# Patient Record
Sex: FEMALE | Race: WHITE | NOT HISPANIC OR LATINO | Employment: OTHER | URBAN - METROPOLITAN AREA
[De-identification: names, ages, dates, MRNs, and addresses within clinical notes are randomized per-mention and may not be internally consistent; named-entity substitution may affect disease eponyms.]

---

## 2023-03-08 ENCOUNTER — TELEPHONE (OUTPATIENT)
Dept: PRIMARY CARE | Facility: CLINIC | Age: 68
End: 2023-03-08

## 2023-03-08 DIAGNOSIS — U07.1 COVID-19: Primary | ICD-10-CM

## 2023-03-08 NOTE — TELEPHONE ENCOUNTER
Pt tested positive for covid yesterday and is requesting paxlovid. Her symptoms started yesterday. She uses the [facility] in [facility] on [address].

## 2023-07-20 LAB
ALANINE AMINOTRANSFERASE (SGPT) (U/L) IN SER/PLAS: 15 U/L (ref 7–45)
ALBUMIN (G/DL) IN SER/PLAS: 4.3 G/DL (ref 3.4–5)
ALKALINE PHOSPHATASE (U/L) IN SER/PLAS: 54 U/L (ref 33–136)
ANION GAP IN SER/PLAS: 12 MMOL/L (ref 10–20)
ASPARTATE AMINOTRANSFERASE (SGOT) (U/L) IN SER/PLAS: 25 U/L (ref 9–39)
BILIRUBIN TOTAL (MG/DL) IN SER/PLAS: 0.8 MG/DL (ref 0–1.2)
CALCIUM (MG/DL) IN SER/PLAS: 9.8 MG/DL (ref 8.6–10.3)
CARBON DIOXIDE, TOTAL (MMOL/L) IN SER/PLAS: 29 MMOL/L (ref 21–32)
CHLORIDE (MMOL/L) IN SER/PLAS: 103 MMOL/L (ref 98–107)
CREATININE (MG/DL) IN SER/PLAS: 0.79 MG/DL (ref 0.5–1.05)
ERYTHROCYTE DISTRIBUTION WIDTH (RATIO) BY AUTOMATED COUNT: 13.1 % (ref 11.5–14.5)
ERYTHROCYTE MEAN CORPUSCULAR HEMOGLOBIN CONCENTRATION (G/DL) BY AUTOMATED: 31.2 G/DL (ref 32–36)
ERYTHROCYTE MEAN CORPUSCULAR VOLUME (FL) BY AUTOMATED COUNT: 98 FL (ref 80–100)
ERYTHROCYTES (10*6/UL) IN BLOOD BY AUTOMATED COUNT: 4.53 X10E12/L (ref 4–5.2)
GFR FEMALE: 80 ML/MIN/1.73M2
GLUCOSE (MG/DL) IN SER/PLAS: 119 MG/DL (ref 74–99)
HEMATOCRIT (%) IN BLOOD BY AUTOMATED COUNT: 44.2 % (ref 36–46)
HEMOGLOBIN (G/DL) IN BLOOD: 13.8 G/DL (ref 12–16)
LEUKOCYTES (10*3/UL) IN BLOOD BY AUTOMATED COUNT: 8.4 X10E9/L (ref 4.4–11.3)
PLATELETS (10*3/UL) IN BLOOD AUTOMATED COUNT: 292 X10E9/L (ref 150–450)
POTASSIUM (MMOL/L) IN SER/PLAS: 4.5 MMOL/L (ref 3.5–5.3)
PROTEIN TOTAL: 7 G/DL (ref 6.4–8.2)
SODIUM (MMOL/L) IN SER/PLAS: 139 MMOL/L (ref 136–145)
UREA NITROGEN (MG/DL) IN SER/PLAS: 19 MG/DL (ref 6–23)

## 2023-07-23 LAB
ALBUMIN ELP: 4.2 G/DL (ref 3.4–5)
ALPHA 1: 0.2 G/DL (ref 0.2–0.6)
ALPHA 2: 0.6 G/DL (ref 0.4–1.1)
BETA: 0.8 G/DL (ref 0.5–1.2)
GAMMA GLOBULIN: 1.1 G/DL (ref 0.5–1.4)
PATH REVIEW-SERUM PROTEIN ELECTROPHORESIS: NORMAL
PROTEIN ELECTROPHORESIS INTERPRETATION: NORMAL
PROTEIN TOTAL: 7 G/DL (ref 6.4–8.2)

## 2023-07-25 LAB — N-TELOPEPTIDE,SERUM: 19.4 NM BCE

## 2023-07-26 LAB
ALKALINE PHOSPHATASE (REF): 63 U/L (ref 40–120)
ALKALINE PHOSPHATASE OTHER: 0 U/L
ALKALINE PHOSPHATASE.BONE (U/L) IN SERUM OR PLASMA: 27 U/L (ref 0–55)
ALKALINE PHOSPHATASE.LIVER (U/L) IN SERUM OR PLASMA: 36 U/L (ref 0–94)

## 2023-08-16 ENCOUNTER — APPOINTMENT (OUTPATIENT)
Dept: PRIMARY CARE | Facility: CLINIC | Age: 68
End: 2023-08-16
Payer: MEDICARE

## 2023-09-20 ENCOUNTER — OFFICE VISIT (OUTPATIENT)
Dept: PRIMARY CARE | Facility: CLINIC | Age: 68
End: 2023-09-20
Payer: MEDICARE

## 2023-09-20 VITALS
HEART RATE: 74 BPM | TEMPERATURE: 97.8 F | OXYGEN SATURATION: 97 % | WEIGHT: 154 LBS | BODY MASS INDEX: 24.17 KG/M2 | HEIGHT: 67 IN | DIASTOLIC BLOOD PRESSURE: 70 MMHG | SYSTOLIC BLOOD PRESSURE: 130 MMHG

## 2023-09-20 DIAGNOSIS — Z00.00 MEDICARE ANNUAL WELLNESS VISIT, SUBSEQUENT: Primary | ICD-10-CM

## 2023-09-20 DIAGNOSIS — Z12.31 BREAST CANCER SCREENING BY MAMMOGRAM: ICD-10-CM

## 2023-09-20 DIAGNOSIS — H61.22 IMPACTED CERUMEN OF LEFT EAR: ICD-10-CM

## 2023-09-20 DIAGNOSIS — K51.20 ULCERATIVE PROCTITIS WITHOUT COMPLICATION (MULTI): ICD-10-CM

## 2023-09-20 DIAGNOSIS — E78.00 ELEVATED LDL CHOLESTEROL LEVEL: ICD-10-CM

## 2023-09-20 PROBLEM — M85.80 OSTEOPENIA: Status: ACTIVE | Noted: 2023-09-20

## 2023-09-20 PROBLEM — J45.909 HYPERACTIVE AIRWAY DISEASE (HHS-HCC): Status: ACTIVE | Noted: 2023-09-20

## 2023-09-20 PROBLEM — M81.0 OSTEOPOROSIS: Status: ACTIVE | Noted: 2023-09-20

## 2023-09-20 PROBLEM — J32.4 CHRONIC PANSINUSITIS: Status: ACTIVE | Noted: 2023-09-20

## 2023-09-20 PROCEDURE — 1160F RVW MEDS BY RX/DR IN RCRD: CPT | Performed by: STUDENT IN AN ORGANIZED HEALTH CARE EDUCATION/TRAINING PROGRAM

## 2023-09-20 PROCEDURE — 1159F MED LIST DOCD IN RCRD: CPT | Performed by: STUDENT IN AN ORGANIZED HEALTH CARE EDUCATION/TRAINING PROGRAM

## 2023-09-20 PROCEDURE — 1036F TOBACCO NON-USER: CPT | Performed by: STUDENT IN AN ORGANIZED HEALTH CARE EDUCATION/TRAINING PROGRAM

## 2023-09-20 PROCEDURE — G0439 PPPS, SUBSEQ VISIT: HCPCS | Performed by: STUDENT IN AN ORGANIZED HEALTH CARE EDUCATION/TRAINING PROGRAM

## 2023-09-20 PROCEDURE — 69210 REMOVE IMPACTED EAR WAX UNI: CPT | Performed by: STUDENT IN AN ORGANIZED HEALTH CARE EDUCATION/TRAINING PROGRAM

## 2023-09-20 PROCEDURE — 1170F FXNL STATUS ASSESSED: CPT | Performed by: STUDENT IN AN ORGANIZED HEALTH CARE EDUCATION/TRAINING PROGRAM

## 2023-09-20 RX ORDER — FLUTICASONE PROPIONATE 50 MCG
1 SPRAY, SUSPENSION (ML) NASAL DAILY
COMMUNITY

## 2023-09-20 RX ORDER — CALCIUM CARBONATE 500(1250)
2 TABLET ORAL DAILY
COMMUNITY

## 2023-09-20 RX ORDER — ALBUTEROL SULFATE 90 UG/1
2 AEROSOL, METERED RESPIRATORY (INHALATION) EVERY 4 HOURS PRN
COMMUNITY
Start: 2021-06-02

## 2023-09-20 RX ORDER — FLUTICASONE FUROATE 100 UG/1
1 POWDER RESPIRATORY (INHALATION) DAILY
Status: ON HOLD | COMMUNITY
End: 2023-12-19 | Stop reason: ENTERED-IN-ERROR

## 2023-09-20 ASSESSMENT — PATIENT HEALTH QUESTIONNAIRE - PHQ9
2. FEELING DOWN, DEPRESSED OR HOPELESS: NOT AT ALL
SUM OF ALL RESPONSES TO PHQ9 QUESTIONS 1 AND 2: 0
1. LITTLE INTEREST OR PLEASURE IN DOING THINGS: NOT AT ALL

## 2023-09-20 ASSESSMENT — ACTIVITIES OF DAILY LIVING (ADL)
GROCERY_SHOPPING: INDEPENDENT
BATHING: INDEPENDENT
TAKING_MEDICATION: INDEPENDENT
DRESSING: INDEPENDENT
DOING_HOUSEWORK: INDEPENDENT
MANAGING_FINANCES: INDEPENDENT

## 2023-09-20 ASSESSMENT — ENCOUNTER SYMPTOMS
DIZZINESS: 0
DIARRHEA: 0
NAUSEA: 0
SHORTNESS OF BREATH: 0
WHEEZING: 0
PALPITATIONS: 0
OCCASIONAL FEELINGS OF UNSTEADINESS: 0
DYSURIA: 0
ABDOMINAL PAIN: 0
FREQUENCY: 0
HEMATURIA: 0
DYSPHORIC MOOD: 0
HEADACHES: 0
FATIGUE: 0
NUMBNESS: 0
CONSTIPATION: 0
COUGH: 0
NERVOUS/ANXIOUS: 0
DEPRESSION: 0
CHILLS: 0
LOSS OF SENSATION IN FEET: 0
FEVER: 0

## 2023-09-20 NOTE — PATIENT INSTRUCTIONS
Recommendations for women annual wellness exam:   Make sure screenings for cervical, breast, and colon cancer are up to date if applicable- pap smears age 21-65, discuss mammogram starting at age 40, colonoscopy at age 45, earlier if positive family history for breast or colon cancer   Screen for osteoporosis with DXA bone scan starting at age 65 or sooner if risk factors present (long term steroid use, smoking, heavy alcohol use, history of fracture, rheumatoid arthritis, low body weight, family history of hip fracture)  Screening for lung cancer with low-dose CT in all adults age 55-77 who have a 30 pack-year smoking history and currently smoke or have quit within the past 15 years  Follow a healthy diet (Dash diet, Mediterranean diet)  Exercise 150 min/wk   Maintain healthy weight (BMI < 25)   Do not smoke   Alcohol in moderation (up to 1 drink/day)  Get enough sleep (7-8 hours/night)  Make sure immunizations are up to date (influenza, pneumococcal, Tdap, shingles if age > 50)  Postmenopausal women or women with osteoporosis need minimum 1,200 mg calcium and 800 IU vitamin D daily  Talk to your physician if you have concerns about depression or anxiety  Visit dentist twice yearly

## 2023-09-20 NOTE — PROGRESS NOTES
Subjective   Reason for Visit: Linda Gomez is an 68 y.o. female here for a Medicare Wellness visit.     HPI  Specialists seen by patient: -Pulm: Dr Javier, pulmonology  -rheum: Dr Boudreaux, osteopenia  -ENT: Dr Thapa, chronic sinus issues  -GI: Dr Chaney, ulcerative proctitis in remission, not on any medication  -Ortho: [facility]  -Derm  -dentist  -eye doctor    Last mammogram: approximate date 12/22 and was normal and will order  Hx of colon ca screening: yes, 2022 repeat in 2025  Hx of DXA: yes, 2022, left total femur -1.6, left femoral neck -2.0, spine 0.1  History of depression or anxiety:no  Immunizations:  UTD, will get flu and covid together  Diet: Follows a healthy diet  Exercise: Gets regular exercise, walking, hiking, occasional weights, walks her dog   Alcohol abuse screen:   4 glasses of wine per week   How many times in the past year 4 or more drinks in a day?  Lung cancer screening:   Smoking history: never a smoker  Drug use: No    Patient Self Assessment of Health Status  Patient Self Assessment: Excellent  Functional Ability/Level of Safety  Cognitive Impairment Observed: No cognitive impairment observed  [place]alls Home Safety Risk Factors  Home Safety Risk Factors: Loose rugs, No grab bars, bathroom  Nutrition and Exercise  Current Diet: Well Balanced Diet  Adequate Fluid Intake: Yes  Caffeine: Yes  Exercise Frequency: Regularly  ADL Screening  Hearing - Right Ear: Difficulty with noise  Hearing - Left Ear: Difficulty with noise  Bathing: Independent  Dressing: Independent  Walks in Home: Independent  IADL's  Managing Finances: Independent  Grocery Shopping: Independent  Taking Medication: Independent  Doing Housework: Independent      Patient Care Team:  Linda Keating DO as PCP - General (Family Medicine)     Review of Systems   Constitutional:  Negative for chills, fatigue and fever.   Respiratory:  Negative for cough, shortness of breath and wheezing.    Cardiovascular:  Negative for chest pain,  "palpitations and leg swelling.   Gastrointestinal:  Negative for abdominal pain, constipation, diarrhea and nausea.   Genitourinary:  Negative for dysuria, frequency, hematuria and urgency.   Neurological:  Negative for dizziness, numbness and headaches.   Psychiatric/Behavioral:  Negative for dysphoric mood. The patient is not nervous/anxious.        Objective   Vitals:  /70 (BP Location: Left arm, Patient Position: Sitting, BP Cuff Size: Adult)   Pulse 74   Temp 36.6 °C (97.8 °F) (Temporal)   Ht 1.689 m (5' 6.5\")   Wt 69.9 kg (154 lb)   SpO2 97%   BMI 24.48 kg/m²       Physical Exam  Constitutional:       General: She is not in acute distress.     Appearance: Normal appearance.   HENT:      Head: Normocephalic and atraumatic.      Right Ear: Tympanic membrane and ear canal normal.      Left Ear: Tympanic membrane and ear canal normal. There is impacted cerumen.      Mouth/Throat:      Mouth: Mucous membranes are moist.      Pharynx: No posterior oropharyngeal erythema.   Eyes:      Extraocular Movements: Extraocular movements intact.      Pupils: Pupils are equal, round, and reactive to light.   Cardiovascular:      Rate and Rhythm: Normal rate and regular rhythm.      Heart sounds: No murmur heard.  Pulmonary:      Effort: Pulmonary effort is normal. No respiratory distress.      Breath sounds: Normal breath sounds. No wheezing.   Abdominal:      General: Bowel sounds are normal.      Palpations: Abdomen is soft.      Tenderness: There is no abdominal tenderness. There is no guarding.   Musculoskeletal:         General: Normal range of motion.      Cervical back: Neck supple.   Skin:     General: Skin is warm and dry.   Neurological:      General: No focal deficit present.      Mental Status: She is alert and oriented to person, place, and time.   Psychiatric:         Mood and Affect: Mood normal.         Behavior: Behavior normal.         Assessment/Plan   Problem List Items Addressed This Visit  "      Ulcerative proctitis without complication (CMS/HCC)    Current Assessment & Plan     Follows with GI.  Colonoscopy 2022, repeat in 2027.  In remission, on no medications.         Elevated LDL cholesterol level    Relevant Orders    Lipid Panel     Other Visit Diagnoses       Medicare annual wellness visit, subsequent    -  Primary    Impacted cerumen of left ear        Breast cancer screening by mammogram        Relevant Orders    BI mammo bilateral screening tomosynthesis          We will obtain fasting blood work.  Results will be communicated to the patient via MyChart or a letter.   We reviewed appropriate preventative health screening guidelines. The patient is  up-to-date on vaccinations, recommended covid vaccine and influenza vaccine.  The patient had colonoscopy in 2022.  They were advised to repeat screening in 2027.  We discussed regular aerobic exercise. We discussed proper nutrition and weight control.  Mammogram ordered.

## 2023-09-20 NOTE — PROGRESS NOTES
Patient ID: Linda Gomez is a 68 y.o. female.    Ear Cerumen Removal    Date/Time: 9/20/2023 4:25 PM    Performed by: Linda Keating DO  Authorized by: Linda Keating DO    Consent:     Consent obtained:  Verbal    Consent given by:  Patient    Risks discussed:  Pain, incomplete removal, bleeding, dizziness and TM perforation    Alternatives discussed:  Referral and no treatment  Universal protocol:     Patient identity confirmed:  Verbally with patient  Procedure details:     Location:  L ear    Procedure type: curette      Procedure outcomes: cerumen removed    Post-procedure details:     Inspection:  TM intact    Hearing quality:  Improved    Procedure completion:  Tolerated well, no immediate complications

## 2023-12-06 ENCOUNTER — APPOINTMENT (OUTPATIENT)
Dept: PULMONOLOGY | Facility: CLINIC | Age: 68
End: 2023-12-06
Payer: MEDICARE

## 2023-12-07 ENCOUNTER — OFFICE VISIT (OUTPATIENT)
Dept: PULMONOLOGY | Facility: HOSPITAL | Age: 68
End: 2023-12-07
Payer: MEDICARE

## 2023-12-07 ENCOUNTER — APPOINTMENT (OUTPATIENT)
Dept: PULMONOLOGY | Facility: HOSPITAL | Age: 68
End: 2023-12-07
Payer: MEDICARE

## 2023-12-07 VITALS
WEIGHT: 143 LBS | TEMPERATURE: 97.5 F | DIASTOLIC BLOOD PRESSURE: 75 MMHG | OXYGEN SATURATION: 98 % | HEIGHT: 67 IN | SYSTOLIC BLOOD PRESSURE: 122 MMHG | HEART RATE: 76 BPM | BODY MASS INDEX: 22.44 KG/M2

## 2023-12-07 DIAGNOSIS — J45.991 COUGH VARIANT ASTHMA (HHS-HCC): Primary | ICD-10-CM

## 2023-12-07 PROCEDURE — 1036F TOBACCO NON-USER: CPT | Performed by: INTERNAL MEDICINE

## 2023-12-07 PROCEDURE — 1159F MED LIST DOCD IN RCRD: CPT | Performed by: INTERNAL MEDICINE

## 2023-12-07 PROCEDURE — 99213 OFFICE O/P EST LOW 20 MIN: CPT | Mod: GC | Performed by: INTERNAL MEDICINE

## 2023-12-07 PROCEDURE — 1160F RVW MEDS BY RX/DR IN RCRD: CPT | Performed by: INTERNAL MEDICINE

## 2023-12-07 PROCEDURE — 99213 OFFICE O/P EST LOW 20 MIN: CPT | Performed by: INTERNAL MEDICINE

## 2023-12-07 PROCEDURE — 1126F AMNT PAIN NOTED NONE PRSNT: CPT | Performed by: INTERNAL MEDICINE

## 2023-12-07 RX ORDER — FLUTICASONE FUROATE 100 UG/1
1 POWDER RESPIRATORY (INHALATION) DAILY
Qty: 3 EACH | Refills: 3 | Status: SHIPPED | OUTPATIENT
Start: 2023-12-07

## 2023-12-07 SDOH — ECONOMIC STABILITY: FOOD INSECURITY: WITHIN THE PAST 12 MONTHS, YOU WORRIED THAT YOUR FOOD WOULD RUN OUT BEFORE YOU GOT MONEY TO BUY MORE.: NEVER TRUE

## 2023-12-07 SDOH — ECONOMIC STABILITY: FOOD INSECURITY: WITHIN THE PAST 12 MONTHS, THE FOOD YOU BOUGHT JUST DIDN'T LAST AND YOU DIDN'T HAVE MONEY TO GET MORE.: NEVER TRUE

## 2023-12-07 ASSESSMENT — COLUMBIA-SUICIDE SEVERITY RATING SCALE - C-SSRS
1. IN THE PAST MONTH, HAVE YOU WISHED YOU WERE DEAD OR WISHED YOU COULD GO TO SLEEP AND NOT WAKE UP?: NO
6. HAVE YOU EVER DONE ANYTHING, STARTED TO DO ANYTHING, OR PREPARED TO DO ANYTHING TO END YOUR LIFE?: NO
2. HAVE YOU ACTUALLY HAD ANY THOUGHTS OF KILLING YOURSELF?: NO
6. HAVE YOU EVER DONE ANYTHING, STARTED TO DO ANYTHING, OR PREPARED TO DO ANYTHING TO END YOUR LIFE?: NO
1. IN THE PAST MONTH, HAVE YOU WISHED YOU WERE DEAD OR WISHED YOU COULD GO TO SLEEP AND NOT WAKE UP?: NO
2. HAVE YOU ACTUALLY HAD ANY THOUGHTS OF KILLING YOURSELF?: NO

## 2023-12-07 ASSESSMENT — PATIENT HEALTH QUESTIONNAIRE - PHQ9
1. LITTLE INTEREST OR PLEASURE IN DOING THINGS: NOT AT ALL
1. LITTLE INTEREST OR PLEASURE IN DOING THINGS: NOT AT ALL
SUM OF ALL RESPONSES TO PHQ9 QUESTIONS 1 AND 2: 0
2. FEELING DOWN, DEPRESSED OR HOPELESS: NOT AT ALL
2. FEELING DOWN, DEPRESSED OR HOPELESS: NOT AT ALL
SUM OF ALL RESPONSES TO PHQ9 QUESTIONS 1 AND 2: 0

## 2023-12-07 ASSESSMENT — PAIN SCALES - GENERAL: PAINLEVEL: 0-NO PAIN

## 2023-12-07 ASSESSMENT — ENCOUNTER SYMPTOMS
OCCASIONAL FEELINGS OF UNSTEADINESS: 0
LOSS OF SENSATION IN FEET: 0
DEPRESSION: 0

## 2023-12-07 NOTE — PATIENT INSTRUCTIONS
Thanks for coming in today. I'm glad your symptoms are doing great.   -I will refill your Arnuity, continue taking daily  -follow up in 6 months, preferably with Dr Javier at that time given your preference

## 2023-12-07 NOTE — PROGRESS NOTES
Reason For Visit  Cough variant Asthma    History Of Present Illness  Ms Gomez is a 67 yo woman with PMHx s/f Asthma, DLD, Osteoporosis who presents to clinic for follow up.     Patient last seen 12/2022 at which time was diagnosed with cough variant asthma. She was recommended to continue ICS, to use antihistamines PRN.     Today she reports doing very well on inhaled ICS. Denies cough, SOB, chest tightness. She has only used albuterol once since the summer when working outside in the yard. Reports good compliance with inhaler, requests refill today. Denies LE edema, orthopnea or PND. Denies recent illness. No longer using Zyrtec and Flonase pending ENT follow up, reports allergy symptoms are well controlled.     12 point ROS is negative except noted above     Past Medical History  Past Medical History:   Diagnosis Date    Personal history of other diseases of the musculoskeletal system and connective tissue     History of osteoarthritis       Surgical History  Past Surgical History:   Procedure Laterality Date    ANKLE SURGERY  01/13/2015    Ankle Surgery    KNEE ARTHROSCOPY W/ DEBRIDEMENT  01/30/2014    Arthroscopy Knee Right    OTHER SURGICAL HISTORY  01/30/2014    Reported Hx Of Hip Replacement - Right Side    OTHER SURGICAL HISTORY  01/30/2014    Treatment Of Knee Fracture Patellar, Open    ROTATOR CUFF REPAIR  01/30/2014    Rotator Cuff Repair       Family History  Family History   Problem Relation Name Age of Onset    Other (esophageal ulcer) Mother      Osteoporosis Mother      Transient ischemic attack Mother      Diabetes Father      Drug abuse Father      Hypertension Father      Cataracts Sister      Diabetes Sister      Hypothyroidism Sister      Lung cancer Sister          Social History  Social History     Tobacco Use    Smoking status: Never    Smokeless tobacco: Never   Vaping Use    Vaping Use: Never used   Substance Use Topics    Alcohol use: Yes     Alcohol/week: 3.0 standard drinks of alcohol  "    Types: 3 Glasses of wine per week    Drug use: Never      Allergies  Amoxicillin-pot clavulanate, Cat dander, Dog hair standardized allergenic extract, Pollen extracts, Ragweed, and Sulfasalazine    Vital Signs  Blood pressure 122/75, pulse 76, temperature 36.4 °C (97.5 °F), height 1.702 m (5' 7\"), weight 64.9 kg (143 lb), SpO2 98 %.  Oxygen Therapy  SpO2: 98 %              Physical Exam  Gen: Pleasant, no acute distress  HEENT: no gross abnormalities  CV: RRR  Lungs: CTAB  GI: soft, non tender, non distended  Ext: no LE edema  Neuro: AOx3, motor and sensory grossly intact  Psych: appears appropriate      Relevant Results  CT SINUS WITHOUT CONTRAST VOLUMETRIC SURGICAL PLANNING    - Impression -  Clinical described polyp within the right middle meatus is not  visualized on exam, which may be due to posttreatment change.    Scattered paranasal sinus mucosal disease as described.    Slight rightward deviation of the bony nasal septum contacting the  right middle turbinate and partially effacing the right middle meatus.    Variant anatomy as discussed.      Assessment/Plan   Ms Gomez is a 69 yo woman with PMHx s/f Asthma, DLD, Osteoporosis who presents to clinic for follow up. Doing well.     #Cough Variant Asthma  -under excellent control  -ICS refilled  -patient requesting follow up with Dr Javier if he returns to practice  -RTC in 6 months, preferred with Dr Javier, any [facility] provider okay if not available    Kamala Keating MD       "

## 2023-12-08 ENCOUNTER — APPOINTMENT (OUTPATIENT)
Dept: PULMONOLOGY | Facility: HOSPITAL | Age: 68
End: 2023-12-08
Payer: MEDICARE

## 2023-12-18 ENCOUNTER — APPOINTMENT (OUTPATIENT)
Dept: CARDIOLOGY | Facility: HOSPITAL | Age: 68
End: 2023-12-18
Payer: MEDICARE

## 2023-12-18 ENCOUNTER — APPOINTMENT (OUTPATIENT)
Dept: RADIOLOGY | Facility: HOSPITAL | Age: 68
End: 2023-12-18
Payer: MEDICARE

## 2023-12-18 ENCOUNTER — HOSPITAL ENCOUNTER (OUTPATIENT)
Facility: HOSPITAL | Age: 68
Setting detail: OBSERVATION
Discharge: HOME | End: 2023-12-19
Attending: EMERGENCY MEDICINE | Admitting: INTERNAL MEDICINE
Payer: MEDICARE

## 2023-12-18 DIAGNOSIS — R00.2 PALPITATIONS: ICD-10-CM

## 2023-12-18 DIAGNOSIS — R07.9 CHEST PAIN, UNSPECIFIED TYPE: ICD-10-CM

## 2023-12-18 DIAGNOSIS — I49.3 FREQUENT PVCS: ICD-10-CM

## 2023-12-18 DIAGNOSIS — I49.8 BIGEMINY: Primary | ICD-10-CM

## 2023-12-18 LAB
ALBUMIN SERPL BCP-MCNC: 5.2 G/DL (ref 3.4–5)
ALP SERPL-CCNC: 67 U/L (ref 33–136)
ALT SERPL W P-5'-P-CCNC: 14 U/L (ref 7–45)
ANION GAP SERPL CALC-SCNC: 15 MMOL/L (ref 10–20)
APPEARANCE UR: ABNORMAL
AST SERPL W P-5'-P-CCNC: 27 U/L (ref 9–39)
BACTERIA #/AREA URNS AUTO: ABNORMAL /HPF
BASOPHILS # BLD AUTO: 0.08 X10*3/UL (ref 0–0.1)
BASOPHILS NFR BLD AUTO: 0.9 %
BILIRUB SERPL-MCNC: 0.6 MG/DL (ref 0–1.2)
BILIRUB UR STRIP.AUTO-MCNC: NEGATIVE MG/DL
BUN SERPL-MCNC: 16 MG/DL (ref 6–23)
CALCIUM SERPL-MCNC: 10.7 MG/DL (ref 8.6–10.3)
CARDIAC TROPONIN I PNL SERPL HS: 10 NG/L (ref 0–13)
CARDIAC TROPONIN I PNL SERPL HS: 6 NG/L (ref 0–13)
CHLORIDE SERPL-SCNC: 99 MMOL/L (ref 98–107)
CO2 SERPL-SCNC: 26 MMOL/L (ref 21–32)
COLOR UR: ABNORMAL
CREAT SERPL-MCNC: 1.1 MG/DL (ref 0.5–1.05)
EOSINOPHIL # BLD AUTO: 0.17 X10*3/UL (ref 0–0.7)
EOSINOPHIL NFR BLD AUTO: 2 %
ERYTHROCYTE [DISTWIDTH] IN BLOOD BY AUTOMATED COUNT: 13.2 % (ref 11.5–14.5)
GFR SERPL CREATININE-BSD FRML MDRD: 54 ML/MIN/1.73M*2
GLUCOSE SERPL-MCNC: 85 MG/DL (ref 74–99)
GLUCOSE UR STRIP.AUTO-MCNC: NEGATIVE MG/DL
HCT VFR BLD AUTO: 48.2 % (ref 36–46)
HGB BLD-MCNC: 15.6 G/DL (ref 12–16)
IMM GRANULOCYTES # BLD AUTO: 0.02 X10*3/UL (ref 0–0.7)
IMM GRANULOCYTES NFR BLD AUTO: 0.2 % (ref 0–0.9)
KETONES UR STRIP.AUTO-MCNC: ABNORMAL MG/DL
LEUKOCYTE ESTERASE UR QL STRIP.AUTO: ABNORMAL
LYMPHOCYTES # BLD AUTO: 3.12 X10*3/UL (ref 1.2–4.8)
LYMPHOCYTES NFR BLD AUTO: 36.6 %
MAGNESIUM SERPL-MCNC: 1.9 MG/DL (ref 1.6–2.4)
MCH RBC QN AUTO: 31.4 PG (ref 26–34)
MCHC RBC AUTO-ENTMCNC: 32.4 G/DL (ref 32–36)
MCV RBC AUTO: 97 FL (ref 80–100)
MONOCYTES # BLD AUTO: 0.62 X10*3/UL (ref 0.1–1)
MONOCYTES NFR BLD AUTO: 7.3 %
MUCOUS THREADS #/AREA URNS AUTO: ABNORMAL /LPF
NEUTROPHILS # BLD AUTO: 4.52 X10*3/UL (ref 1.2–7.7)
NEUTROPHILS NFR BLD AUTO: 53 %
NITRITE UR QL STRIP.AUTO: NEGATIVE
NRBC BLD-RTO: 0 /100 WBCS (ref 0–0)
PH UR STRIP.AUTO: 5 [PH]
PLATELET # BLD AUTO: 331 X10*3/UL (ref 150–450)
POTASSIUM SERPL-SCNC: 3.6 MMOL/L (ref 3.5–5.3)
PROT SERPL-MCNC: 9.1 G/DL (ref 6.4–8.2)
PROT UR STRIP.AUTO-MCNC: NEGATIVE MG/DL
RBC # BLD AUTO: 4.97 X10*6/UL (ref 4–5.2)
RBC # UR STRIP.AUTO: ABNORMAL /UL
RBC #/AREA URNS AUTO: ABNORMAL /HPF
SODIUM SERPL-SCNC: 136 MMOL/L (ref 136–145)
SP GR UR STRIP.AUTO: 1
SQUAMOUS #/AREA URNS AUTO: ABNORMAL /HPF
T4 FREE SERPL-MCNC: 1 NG/DL (ref 0.61–1.12)
TSH SERPL-ACNC: 7.26 MIU/L (ref 0.44–3.98)
UROBILINOGEN UR STRIP.AUTO-MCNC: <2 MG/DL
WBC # BLD AUTO: 8.5 X10*3/UL (ref 4.4–11.3)
WBC #/AREA URNS AUTO: ABNORMAL /HPF

## 2023-12-18 PROCEDURE — 85025 COMPLETE CBC W/AUTO DIFF WBC: CPT | Performed by: EMERGENCY MEDICINE

## 2023-12-18 PROCEDURE — 83735 ASSAY OF MAGNESIUM: CPT | Performed by: EMERGENCY MEDICINE

## 2023-12-18 PROCEDURE — 71275 CT ANGIOGRAPHY CHEST: CPT

## 2023-12-18 PROCEDURE — 74174 CTA ABD&PLVS W/CONTRAST: CPT | Performed by: RADIOLOGY

## 2023-12-18 PROCEDURE — 99285 EMERGENCY DEPT VISIT HI MDM: CPT | Mod: 25 | Performed by: EMERGENCY MEDICINE

## 2023-12-18 PROCEDURE — 71275 CT ANGIOGRAPHY CHEST: CPT | Performed by: RADIOLOGY

## 2023-12-18 PROCEDURE — 81001 URINALYSIS AUTO W/SCOPE: CPT | Performed by: EMERGENCY MEDICINE

## 2023-12-18 PROCEDURE — 93005 ELECTROCARDIOGRAM TRACING: CPT

## 2023-12-18 PROCEDURE — 36415 COLL VENOUS BLD VENIPUNCTURE: CPT | Performed by: EMERGENCY MEDICINE

## 2023-12-18 PROCEDURE — 84443 ASSAY THYROID STIM HORMONE: CPT | Performed by: EMERGENCY MEDICINE

## 2023-12-18 PROCEDURE — 87086 URINE CULTURE/COLONY COUNT: CPT | Mod: AHULAB | Performed by: EMERGENCY MEDICINE

## 2023-12-18 PROCEDURE — 96372 THER/PROPH/DIAG INJ SC/IM: CPT | Mod: 59

## 2023-12-18 PROCEDURE — 84439 ASSAY OF FREE THYROXINE: CPT | Performed by: EMERGENCY MEDICINE

## 2023-12-18 PROCEDURE — 2550000001 HC RX 255 CONTRASTS: Performed by: EMERGENCY MEDICINE

## 2023-12-18 PROCEDURE — 84484 ASSAY OF TROPONIN QUANT: CPT | Performed by: EMERGENCY MEDICINE

## 2023-12-18 PROCEDURE — 2500000004 HC RX 250 GENERAL PHARMACY W/ HCPCS (ALT 636 FOR OP/ED): Performed by: EMERGENCY MEDICINE

## 2023-12-18 PROCEDURE — 80053 COMPREHEN METABOLIC PANEL: CPT | Performed by: EMERGENCY MEDICINE

## 2023-12-18 RX ADMIN — IOHEXOL 75 ML: 350 INJECTION, SOLUTION INTRAVENOUS at 21:59

## 2023-12-18 RX ADMIN — SODIUM CHLORIDE 500 ML: 9 INJECTION, SOLUTION INTRAVENOUS at 23:25

## 2023-12-18 ASSESSMENT — PAIN SCALES - GENERAL: PAINLEVEL_OUTOF10: 6

## 2023-12-18 ASSESSMENT — PAIN DESCRIPTION - ORIENTATION: ORIENTATION: UPPER

## 2023-12-18 ASSESSMENT — COLUMBIA-SUICIDE SEVERITY RATING SCALE - C-SSRS
2. HAVE YOU ACTUALLY HAD ANY THOUGHTS OF KILLING YOURSELF?: NO
1. IN THE PAST MONTH, HAVE YOU WISHED YOU WERE DEAD OR WISHED YOU COULD GO TO SLEEP AND NOT WAKE UP?: NO
6. HAVE YOU EVER DONE ANYTHING, STARTED TO DO ANYTHING, OR PREPARED TO DO ANYTHING TO END YOUR LIFE?: NO

## 2023-12-18 ASSESSMENT — PAIN - FUNCTIONAL ASSESSMENT: PAIN_FUNCTIONAL_ASSESSMENT: 0-10

## 2023-12-18 ASSESSMENT — PAIN DESCRIPTION - LOCATION: LOCATION: BACK

## 2023-12-18 NOTE — Clinical Note
ED UM Review Complete - Patient Meets Inpatient Criteria  @[email or web address]

## 2023-12-19 ENCOUNTER — APPOINTMENT (OUTPATIENT)
Dept: CARDIOLOGY | Facility: HOSPITAL | Age: 68
End: 2023-12-19
Payer: MEDICARE

## 2023-12-19 VITALS
WEIGHT: 148 LBS | HEART RATE: 69 BPM | TEMPERATURE: 97.3 F | BODY MASS INDEX: 23.78 KG/M2 | RESPIRATION RATE: 18 BRPM | HEIGHT: 66 IN | DIASTOLIC BLOOD PRESSURE: 75 MMHG | OXYGEN SATURATION: 95 % | SYSTOLIC BLOOD PRESSURE: 121 MMHG

## 2023-12-19 PROBLEM — R00.2 PALPITATIONS: Status: ACTIVE | Noted: 2023-12-19

## 2023-12-19 LAB
ANION GAP SERPL CALC-SCNC: 11 MMOL/L (ref 10–20)
ATRIAL RATE: 77 BPM
ATRIAL RATE: 79 BPM
BUN SERPL-MCNC: 12 MG/DL (ref 6–23)
CALCIUM SERPL-MCNC: 9.2 MG/DL (ref 8.6–10.3)
CHLORIDE SERPL-SCNC: 107 MMOL/L (ref 98–107)
CO2 SERPL-SCNC: 28 MMOL/L (ref 21–32)
CREAT SERPL-MCNC: 0.82 MG/DL (ref 0.5–1.05)
ERYTHROCYTE [DISTWIDTH] IN BLOOD BY AUTOMATED COUNT: 13.3 % (ref 11.5–14.5)
GFR SERPL CREATININE-BSD FRML MDRD: 77 ML/MIN/1.73M*2
GLUCOSE SERPL-MCNC: 92 MG/DL (ref 74–99)
HCT VFR BLD AUTO: 40.3 % (ref 36–46)
HGB BLD-MCNC: 12.9 G/DL (ref 12–16)
MAGNESIUM SERPL-MCNC: 2.2 MG/DL (ref 1.6–2.4)
MCH RBC QN AUTO: 31.6 PG (ref 26–34)
MCHC RBC AUTO-ENTMCNC: 32 G/DL (ref 32–36)
MCV RBC AUTO: 99 FL (ref 80–100)
NRBC BLD-RTO: 0 /100 WBCS (ref 0–0)
P AXIS: 79 DEGREES
P AXIS: 82 DEGREES
P OFFSET: 201 MS
P OFFSET: 206 MS
P ONSET: 152 MS
P ONSET: 159 MS
PLATELET # BLD AUTO: 270 X10*3/UL (ref 150–450)
POTASSIUM SERPL-SCNC: 4.4 MMOL/L (ref 3.5–5.3)
PR INTERVAL: 132 MS
PR INTERVAL: 140 MS
Q ONSET: 222 MS
Q ONSET: 225 MS
QRS COUNT: 13 BEATS
QRS COUNT: 13 BEATS
QRS DURATION: 74 MS
QRS DURATION: 84 MS
QT INTERVAL: 390 MS
QT INTERVAL: 394 MS
QTC CALCULATION(BAZETT): 441 MS
QTC CALCULATION(BAZETT): 451 MS
QTC FREDERICIA: 423 MS
QTC FREDERICIA: 432 MS
R AXIS: 62 DEGREES
R AXIS: 63 DEGREES
RBC # BLD AUTO: 4.08 X10*6/UL (ref 4–5.2)
SODIUM SERPL-SCNC: 142 MMOL/L (ref 136–145)
T AXIS: 14 DEGREES
T AXIS: 14 DEGREES
T OFFSET: 419 MS
T OFFSET: 420 MS
VENTRICULAR RATE: 77 BPM
VENTRICULAR RATE: 79 BPM
WBC # BLD AUTO: 7.3 X10*3/UL (ref 4.4–11.3)

## 2023-12-19 PROCEDURE — G0378 HOSPITAL OBSERVATION PER HR: HCPCS

## 2023-12-19 PROCEDURE — 80048 BASIC METABOLIC PNL TOTAL CA: CPT | Performed by: PHYSICIAN ASSISTANT

## 2023-12-19 PROCEDURE — 99223 1ST HOSP IP/OBS HIGH 75: CPT | Performed by: NURSE PRACTITIONER

## 2023-12-19 PROCEDURE — 96372 THER/PROPH/DIAG INJ SC/IM: CPT | Mod: 59 | Performed by: PHYSICIAN ASSISTANT

## 2023-12-19 PROCEDURE — 83735 ASSAY OF MAGNESIUM: CPT | Performed by: PHYSICIAN ASSISTANT

## 2023-12-19 PROCEDURE — 96372 THER/PROPH/DIAG INJ SC/IM: CPT

## 2023-12-19 PROCEDURE — 2500000004 HC RX 250 GENERAL PHARMACY W/ HCPCS (ALT 636 FOR OP/ED): Mod: MUEWO,MUE | Performed by: PHYSICIAN ASSISTANT

## 2023-12-19 PROCEDURE — 85027 COMPLETE CBC AUTOMATED: CPT | Performed by: PHYSICIAN ASSISTANT

## 2023-12-19 PROCEDURE — 93005 ELECTROCARDIOGRAM TRACING: CPT

## 2023-12-19 PROCEDURE — 99221 1ST HOSP IP/OBS SF/LOW 40: CPT | Performed by: STUDENT IN AN ORGANIZED HEALTH CARE EDUCATION/TRAINING PROGRAM

## 2023-12-19 PROCEDURE — 36415 COLL VENOUS BLD VENIPUNCTURE: CPT | Performed by: PHYSICIAN ASSISTANT

## 2023-12-19 RX ORDER — METOPROLOL TARTRATE 25 MG/1
25 TABLET, FILM COATED ORAL 2 TIMES DAILY PRN
Status: DISCONTINUED | OUTPATIENT
Start: 2023-12-19 | End: 2023-12-19

## 2023-12-19 RX ORDER — PANTOPRAZOLE SODIUM 40 MG/10ML
40 INJECTION, POWDER, LYOPHILIZED, FOR SOLUTION INTRAVENOUS
Status: DISCONTINUED | OUTPATIENT
Start: 2023-12-19 | End: 2023-12-19 | Stop reason: HOSPADM

## 2023-12-19 RX ORDER — METOPROLOL TARTRATE 25 MG/1
25 TABLET, FILM COATED ORAL 2 TIMES DAILY PRN
Status: DISCONTINUED | OUTPATIENT
Start: 2023-12-19 | End: 2023-12-19 | Stop reason: HOSPADM

## 2023-12-19 RX ORDER — DOCUSATE SODIUM 100 MG/1
100 CAPSULE, LIQUID FILLED ORAL 2 TIMES DAILY PRN
Status: DISCONTINUED | OUTPATIENT
Start: 2023-12-19 | End: 2023-12-19 | Stop reason: HOSPADM

## 2023-12-19 RX ORDER — METOPROLOL TARTRATE 25 MG/1
25 TABLET, FILM COATED ORAL 2 TIMES DAILY PRN
Qty: 30 TABLET | Refills: 0 | Status: SHIPPED | OUTPATIENT
Start: 2023-12-19

## 2023-12-19 RX ORDER — ACETAMINOPHEN 325 MG/1
950 TABLET ORAL EVERY 6 HOURS PRN
Status: DISCONTINUED | OUTPATIENT
Start: 2023-12-19 | End: 2023-12-19 | Stop reason: HOSPADM

## 2023-12-19 RX ORDER — PANTOPRAZOLE SODIUM 40 MG/1
40 TABLET, DELAYED RELEASE ORAL
Status: DISCONTINUED | OUTPATIENT
Start: 2023-12-19 | End: 2023-12-19 | Stop reason: HOSPADM

## 2023-12-19 RX ORDER — TALC
3 POWDER (GRAM) TOPICAL
Status: DISCONTINUED | OUTPATIENT
Start: 2023-12-19 | End: 2023-12-19 | Stop reason: HOSPADM

## 2023-12-19 RX ORDER — ENOXAPARIN SODIUM 100 MG/ML
40 INJECTION SUBCUTANEOUS EVERY 24 HOURS
Status: DISCONTINUED | OUTPATIENT
Start: 2023-12-19 | End: 2023-12-19 | Stop reason: HOSPADM

## 2023-12-19 RX ADMIN — ENOXAPARIN SODIUM 40 MG: 40 INJECTION SUBCUTANEOUS at 03:13

## 2023-12-19 RX ADMIN — PANTOPRAZOLE SODIUM 40 MG: 40 TABLET, DELAYED RELEASE ORAL at 06:40

## 2023-12-19 SDOH — SOCIAL STABILITY: SOCIAL INSECURITY: DO YOU FEEL UNSAFE GOING BACK TO THE PLACE WHERE YOU ARE LIVING?: NO

## 2023-12-19 SDOH — SOCIAL STABILITY: SOCIAL INSECURITY: ABUSE: ADULT

## 2023-12-19 SDOH — SOCIAL STABILITY: SOCIAL INSECURITY: HAS ANYONE EVER THREATENED TO HURT YOUR FAMILY OR YOUR PETS?: NO

## 2023-12-19 SDOH — SOCIAL STABILITY: SOCIAL INSECURITY: HAVE YOU HAD THOUGHTS OF HARMING ANYONE ELSE?: NO

## 2023-12-19 SDOH — SOCIAL STABILITY: SOCIAL INSECURITY: WERE YOU ABLE TO COMPLETE ALL THE BEHAVIORAL HEALTH SCREENINGS?: YES

## 2023-12-19 SDOH — SOCIAL STABILITY: SOCIAL INSECURITY: ARE YOU OR HAVE YOU BEEN THREATENED OR ABUSED PHYSICALLY, EMOTIONALLY, OR SEXUALLY BY ANYONE?: NO

## 2023-12-19 SDOH — SOCIAL STABILITY: SOCIAL INSECURITY: ARE THERE ANY APPARENT SIGNS OF INJURIES/BEHAVIORS THAT COULD BE RELATED TO ABUSE/NEGLECT?: NO

## 2023-12-19 SDOH — SOCIAL STABILITY: SOCIAL INSECURITY: DO YOU FEEL ANYONE HAS EXPLOITED OR TAKEN ADVANTAGE OF YOU FINANCIALLY OR OF YOUR PERSONAL PROPERTY?: NO

## 2023-12-19 SDOH — SOCIAL STABILITY: SOCIAL INSECURITY: DOES ANYONE TRY TO KEEP YOU FROM HAVING/CONTACTING OTHER FRIENDS OR DOING THINGS OUTSIDE YOUR HOME?: NO

## 2023-12-19 ASSESSMENT — ACTIVITIES OF DAILY LIVING (ADL)
LACK_OF_TRANSPORTATION: NO
PATIENT'S MEMORY ADEQUATE TO SAFELY COMPLETE DAILY ACTIVITIES?: YES
BATHING: INDEPENDENT
DRESSING YOURSELF: INDEPENDENT
JUDGMENT_ADEQUATE_SAFELY_COMPLETE_DAILY_ACTIVITIES: YES
FEEDING YOURSELF: INDEPENDENT
LACK_OF_TRANSPORTATION: NO
ADEQUATE_TO_COMPLETE_ADL: YES
HEARING - RIGHT EAR: FUNCTIONAL
WALKS IN HOME: INDEPENDENT
HEARING - LEFT EAR: FUNCTIONAL
LACK_OF_TRANSPORTATION: NO
TOILETING: INDEPENDENT
GROOMING: INDEPENDENT

## 2023-12-19 ASSESSMENT — LIFESTYLE VARIABLES
HOW OFTEN DO YOU HAVE A DRINK CONTAINING ALCOHOL: MONTHLY OR LESS
AUDIT-C TOTAL SCORE: 1
HOW OFTEN DO YOU HAVE 6 OR MORE DRINKS ON ONE OCCASION: NEVER
PRESCIPTION_ABUSE_PAST_12_MONTHS: NO
HOW MANY STANDARD DRINKS CONTAINING ALCOHOL DO YOU HAVE ON A TYPICAL DAY: 1 OR 2
SKIP TO QUESTIONS 9-10: 1
AUDIT-C TOTAL SCORE: 1
SUBSTANCE_ABUSE_PAST_12_MONTHS: NO

## 2023-12-19 ASSESSMENT — COGNITIVE AND FUNCTIONAL STATUS - GENERAL
MOBILITY SCORE: 24
PATIENT BASELINE BEDBOUND: NO
DAILY ACTIVITIY SCORE: 24

## 2023-12-19 ASSESSMENT — ENCOUNTER SYMPTOMS
APPETITE CHANGE: 1
PALPITATIONS: 1
FLANK PAIN: 1
DIZZINESS: 1

## 2023-12-19 ASSESSMENT — PATIENT HEALTH QUESTIONNAIRE - PHQ9
SUM OF ALL RESPONSES TO PHQ9 QUESTIONS 1 & 2: 0
2. FEELING DOWN, DEPRESSED OR HOPELESS: NOT AT ALL
1. LITTLE INTEREST OR PLEASURE IN DOING THINGS: NOT AT ALL

## 2023-12-19 ASSESSMENT — PAIN - FUNCTIONAL ASSESSMENT: PAIN_FUNCTIONAL_ASSESSMENT: 0-10

## 2023-12-19 ASSESSMENT — PAIN SCALES - GENERAL: PAINLEVEL_OUTOF10: 0 - NO PAIN

## 2023-12-19 NOTE — H&P
History Of Present Illness  Linda Gomez is a 68 y.o. female presenting with palpitations  and dizziness.  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  She reports that yesterday she was wrapping presents, going up and down the stairs.  Later on she was sitting down with her  on the couch doing [place] time word games.  She then started noticing palpitations.  She also endorses a pain that started yesterday between her shoulder blades.  She initially waited about 45 minutes from onset of palpitations, but due to palpitations persisting she called EMS.  She also endorses that over the past 2 weeks she has had a left flank pain.  She is not believe it is muscular skeletal as it is not reproducible.  She also endorses a decreased appetite over the past 1 to 2 weeks.  She does endorse that yesterday she did skip lunch.  She reports she is also not the best at drinking adequate fluids.  She denies any abdominal pain.  But does endorse over the past 2 weeks she has noticed more burping and a sour taste in her mouth, leading her to wonder if this is GI related.     Past Medical History  Past Medical History:   Diagnosis Date    Personal history of other diseases of the musculoskeletal system and connective tissue     History of osteoarthritis       Surgical History  Past Surgical History:   Procedure Laterality Date    ANKLE SURGERY  01/13/2015    Ankle Surgery    KNEE ARTHROSCOPY W/ DEBRIDEMENT  01/30/2014    Arthroscopy Knee Right    OTHER SURGICAL HISTORY  01/30/2014    Reported Hx Of Hip Replacement - Right Side    OTHER SURGICAL HISTORY  01/30/2014    Treatment Of Knee Fracture Patellar, Open    ROTATOR CUFF REPAIR  01/30/2014    Rotator Cuff Repair        Social History  She reports that she has never smoked. She has never used smokeless tobacco. She reports current alcohol use of about 3.0 standard drinks of alcohol per week. She reports that she does not use  "drugs.    Family History  Family History   Problem Relation Name Age of Onset    Other (esophageal ulcer) Mother      Osteoporosis Mother      Transient ischemic attack Mother      Diabetes Father      Drug abuse Father      Hypertension Father      Cataracts Sister      Diabetes Sister      Hypothyroidism Sister      Lung cancer Sister          Allergies  Amoxicillin-pot clavulanate, Cat dander, Dog hair standardized allergenic extract, Pollen extracts, Ragweed, and Sulfasalazine    Review of Systems   Constitutional:  Positive for appetite change.   Cardiovascular:  Positive for palpitations.   Genitourinary:  Positive for flank pain.   Neurological:  Positive for dizziness.   All other systems reviewed and are negative.       Physical Exam  Constitutional:       Appearance: Normal appearance.   Cardiovascular:      Rate and Rhythm: Normal rate and regular rhythm.      Pulses: Normal pulses.      Heart sounds: Normal heart sounds. No murmur heard.     No gallop.   Pulmonary:      Effort: Pulmonary effort is normal. No respiratory distress.      Breath sounds: Normal breath sounds. No wheezing or rhonchi.   Abdominal:      General: Abdomen is flat. There is no distension.      Palpations: Abdomen is soft.      Tenderness: There is no abdominal tenderness. There is no guarding.   Musculoskeletal:         General: Normal range of motion.   Skin:     General: Skin is warm.      Capillary Refill: Capillary refill takes less than 2 seconds.   Neurological:      Mental Status: She is alert and oriented to person, place, and time.   Psychiatric:         Mood and Affect: Mood normal.         Thought Content: Thought content normal.         Judgment: Judgment normal.        Last Recorded Vitals  Blood pressure 137/78, pulse 71, temperature 36.3 °C (97.3 °F), temperature source Oral, resp. rate 18, height 1.676 m (5' 6\"), weight 67.1 kg (148 lb), SpO2 97 %.    Relevant Results  Scheduled medications  enoxaparin, 40 mg, " subcutaneous, q24h  melatonin, 3 mg, oral, Daily  pantoprazole, 40 mg, oral, Daily before breakfast   Or  pantoprazole, 40 mg, intravenous, Daily before breakfast      Continuous medications     PRN medications  PRN medications: acetaminophen, docusate sodium    Results for orders placed or performed during the hospital encounter of 12/18/23 (from the past 24 hour(s))   CBC and Auto Differential   Result Value Ref Range    WBC 8.5 4.4 - 11.3 x10*3/uL    nRBC 0.0 0.0 - 0.0 /100 WBCs    RBC 4.97 4.00 - 5.20 x10*6/uL    Hemoglobin 15.6 12.0 - 16.0 g/dL    Hematocrit 48.2 (H) 36.0 - 46.0 %    MCV 97 80 - 100 fL    MCH 31.4 26.0 - 34.0 pg    MCHC 32.4 32.0 - 36.0 g/dL    RDW 13.2 11.5 - 14.5 %    Platelets 331 150 - 450 x10*3/uL    Neutrophils % 53.0 40.0 - 80.0 %    Immature Granulocytes %, Automated 0.2 0.0 - 0.9 %    Lymphocytes % 36.6 13.0 - 44.0 %    Monocytes % 7.3 2.0 - 10.0 %    Eosinophils % 2.0 0.0 - 6.0 %    Basophils % 0.9 0.0 - 2.0 %    Neutrophils Absolute 4.52 1.20 - 7.70 x10*3/uL    Immature Granulocytes Absolute, Automated 0.02 0.00 - 0.70 x10*3/uL    Lymphocytes Absolute 3.12 1.20 - 4.80 x10*3/uL    Monocytes Absolute 0.62 0.10 - 1.00 x10*3/uL    Eosinophils Absolute 0.17 0.00 - 0.70 x10*3/uL    Basophils Absolute 0.08 0.00 - 0.10 x10*3/uL   Comprehensive metabolic panel   Result Value Ref Range    Glucose 85 74 - 99 mg/dL    Sodium 136 136 - 145 mmol/L    Potassium 3.6 3.5 - 5.3 mmol/L    Chloride 99 98 - 107 mmol/L    Bicarbonate 26 21 - 32 mmol/L    Anion Gap 15 10 - 20 mmol/L    Urea Nitrogen 16 6 - 23 mg/dL    Creatinine 1.10 (H) 0.50 - 1.05 mg/dL    eGFR 54 (L) >60 mL/min/1.73m*2    Calcium 10.7 (H) 8.6 - 10.3 mg/dL    Albumin 5.2 (H) 3.4 - 5.0 g/dL    Alkaline Phosphatase 67 33 - 136 U/L    Total Protein 9.1 (H) 6.4 - 8.2 g/dL    AST 27 9 - 39 U/L    Bilirubin, Total 0.6 0.0 - 1.2 mg/dL    ALT 14 7 - 45 U/L   Troponin I, High Sensitivity   Result Value Ref Range    Troponin I, High Sensitivity 6  0 - 13 ng/L   TSH with reflex to Free T4 if abnormal   Result Value Ref Range    Thyroid Stimulating Hormone 7.26 (H) 0.44 - 3.98 mIU/L   Thyroxine, Free   Result Value Ref Range    Thyroxine, Free 1.00 0.61 - 1.12 ng/dL   ECG 12 lead   Result Value Ref Range    Ventricular Rate 79 BPM    Atrial Rate 79 BPM    IA Interval 140 ms    QRS Duration 84 ms    QT Interval 394 ms    QTC Calculation(Bazett) 451 ms    P Axis 79 degrees    R Axis 63 degrees    T Axis 14 degrees    QRS Count 13 beats    Q Onset 222 ms    P Onset 152 ms    P Offset 206 ms    T Offset 419 ms    QTC Fredericia 432 ms   Urinalysis with Reflex Microscopic and Culture   Result Value Ref Range    Color, Urine Straw Straw, Yellow    Appearance, Urine Hazy (N) Clear    Specific Gravity, Urine 1.005 1.005 - 1.035    pH, Urine 5.0 5.0, 5.5, 6.0, 6.5, 7.0, 7.5, 8.0    Protein, Urine NEGATIVE NEGATIVE mg/dL    Glucose, Urine NEGATIVE NEGATIVE mg/dL    Blood, Urine SMALL (1+) (A) NEGATIVE    Ketones, Urine 5 (TRACE) (A) NEGATIVE mg/dL    Bilirubin, Urine NEGATIVE NEGATIVE    Urobilinogen, Urine <2.0 <2.0 mg/dL    Nitrite, Urine NEGATIVE NEGATIVE    Leukocyte Esterase, Urine LARGE (3+) (A) NEGATIVE   Microscopic Only, Urine   Result Value Ref Range    WBC, Urine 1-5 1-5, NONE /HPF    RBC, Urine 1-2 NONE, 1-2, 3-5 /HPF    Squamous Epithelial Cells, Urine 1-9 (SPARSE) Reference range not established. /HPF    Bacteria, Urine 1+ (A) NONE SEEN /HPF    Mucus, Urine 1+ Reference range not established. /LPF   Troponin I, High Sensitivity   Result Value Ref Range    Troponin I, High Sensitivity 10 0 - 13 ng/L   Magnesium   Result Value Ref Range    Magnesium 1.90 1.60 - 2.40 mg/dL   ECG 12 lead   Result Value Ref Range    Ventricular Rate 77 BPM    Atrial Rate 77 BPM    IA Interval 132 ms    QRS Duration 74 ms    QT Interval 390 ms    QTC Calculation(Bazett) 441 ms    P Axis 82 degrees    R Axis 62 degrees    T Axis 14 degrees    QRS Count 13 beats    Q Onset 225 ms     P Onset 159 ms    P Offset 201 ms    T Offset 420 ms    QTC Fredericia 423 ms   CBC   Result Value Ref Range    WBC 7.3 4.4 - 11.3 x10*3/uL    nRBC 0.0 0.0 - 0.0 /100 WBCs    RBC 4.08 4.00 - 5.20 x10*6/uL    Hemoglobin 12.9 12.0 - 16.0 g/dL    Hematocrit 40.3 36.0 - 46.0 %    MCV 99 80 - 100 fL    MCH 31.6 26.0 - 34.0 pg    MCHC 32.0 32.0 - 36.0 g/dL    RDW 13.3 11.5 - 14.5 %    Platelets 270 150 - 450 x10*3/uL   Magnesium   Result Value Ref Range    Magnesium 2.20 1.60 - 2.40 mg/dL   Basic metabolic panel   Result Value Ref Range    Glucose 92 74 - 99 mg/dL    Sodium 142 136 - 145 mmol/L    Potassium 4.4 3.5 - 5.3 mmol/L    Chloride 107 98 - 107 mmol/L    Bicarbonate 28 21 - 32 mmol/L    Anion Gap 11 10 - 20 mmol/L    Urea Nitrogen 12 6 - 23 mg/dL    Creatinine 0.82 0.50 - 1.05 mg/dL    eGFR 77 >60 mL/min/1.73m*2    Calcium 9.2 8.6 - 10.3 mg/dL       ECG 12 lead    Result Date: 12/19/2023  See ED provider note for full interpretation and clinical correlation    ECG 12 lead    Result Date: 12/19/2023  See ED provider note for full interpretation and clinical correlation    CT angio chest abdomen pelvis    Result Date: 12/18/2023  Interpreted By:  Yakelin Xie, STUDY: CT ANGIO CHEST ABDOMEN PELVIS;  12/18/2023 10:31 pm   INDICATION: Signs/Symptoms:cp, pain between the shoulder blades   COMPARISON: None.   ACCESSION NUMBER(S): [id number]   ORDERING CLINICIAN: REYNA SORENSON   TECHNIQUE: Noncontrast axial CT images of the chest, abdomen and pelvis were obtained prior to intravenous contrast administration. Axial CT images of the chest, abdomen and pelvis was obtained after uneventful intravenous administration of 75 mLOmnipaque 350 contrast material using CT angiographic technique. Coronal and sagittal images are reconstructed.  3-D and MIP reconstructions were performed on an independent workstation and provided for review.   FINDINGS: NON-CONTRAST IMAGING:   Mild atherosclerotic calcifications  of the thoracic aorta. No hyperdense intramural thrombus. Mild atherosclerotic calcifications at the abdominal aorta and its branches. There is a small hiatal hernia. No obstructing ureteral calculi.     POST-CONTRAST IMAGING:   VASCULAR: AORTA: No thoracic aortic aneurysm or acute dissection. No abdominal aortic aneurysm or acute dissection. The celiac artery, the superior mesenteric artery, the bilateral renal arteries and inferior mesenteric artery are patent. There is focal narrowing at the proximal celiac artery with poststenotic dilation. The bilateral common iliac arteries, the bilateral internal iliac arteries, the bilateral external iliac arteries, the bilateral common femoral arteries and the visualized bilateral proximal femoral arteries are patent.     CHEST:   HEART: Normal size. No pericardial effusion. MEDIASTINUM AND JOHNNIE: No pathologically enlarged thoracic lymph nodes. LUNG, PLEURA, LARGE AIRWAYS: The trachea and the mainstem bronchi are patent.No consolidation, pleural effusion or pneumothorax. CHEST WALL AND LOWER NECK: Within normal limits.   ABDOMEN:   BONES: Multilevel degenerative changes of the spine. Status post total right hip arthroplasty. Mild degenerative changes at the left hip. ABDOMINAL WALL: There is a small fat containing umbilical hernia.   LIVER: Unremarkable. BILE DUCTS: No significant dilation. GALLBLADDER: Present. PANCREAS: No peripancreatic soft tissue stranding. SPLEEN: Unremarkable. ADRENALS:  Unremarkable. KIDNEYS: Symmetric renal enhancement.  No hydronephrosis. URETERS: No hydroureter.   PELVIS: The pelvis is partially obscured by streak artifact from the right hip prosthesis. REPRODUCTIVE ORGANS: The uterus is present. BLADDER: Excreted intravenous contrast is noted at the urinary bladder.   RETROPERITONEUM: No retroperitoneal hemorrhage. BOWEL: The stomach is decompressed. No bowel obstruction. There is colonic diverticulosis with no CT evidence for acute  diverticulitis. Normal appendix. PERITONEUM: No ascites or free air. No pathologically enlarged lymph nodes are identified.       1. No aortic aneurysm or acute dissection. 2. Focal narrowing at the proximal celiac artery with poststenotic dilation. Please correlate with clinical concern for median arcuate ligament syndrome. 3. Small hiatal hernia. 4. Colonic diverticulosis.       MACRO: None.   Signed by: Yakelin Xie 12/18/2023 11:12 PM Dictation workstation:   [id number]        Assessment/Plan   Principal Problem:    Palpitations    Linda Gomez is a 68 y.o. female presenting with palpitations  and dizziness.  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  She reports that yesterday she was wrapping presents, going up and down the stairs.  Later on she was sitting down with her  on the couch doing [place] time word games.  She then started noticing palpitations.  She also endorses a pain that started yesterday between her shoulder blades.  She initially waited about 45 minutes from onset of palpitations, but due to palpitations persisting she called EMS.  She also endorses that over the past 2 weeks she has had a left flank pain.  She is not believe it is muscular skeletal as it is not reproducible.  She also endorses a decreased appetite over the past 1 to 2 weeks.  She does endorse that yesterday she did skip lunch.  She reports she is also not the best at drinking adequate fluids.  She denies any abdominal pain.  But does endorse over the past 2 weeks she has noticed more burping and a sour taste in her mouth, leading her to wonder if this is GI related.    Palpitations  -tele-> SR with PVC's and palpiatations   -cardiology consulted     L Flank pain/ belching  -related to median arcuate ligament syndrome? Having no abdominal pain which is atypical     DVT prophylaxis:  Lovenox, SCD    DC plan:  DC home when medically stable    Labs/Testing  reviewed    Interdisciplinary team rounding completed with hospitalist, nurse, TCC    NP discussed plan and lab/testing results with Dr. Kinsey. Cardiology cleared for DC, f/u Dr. Pnik in 4-8 weeks, PRN metoprolol for palpitations a DC. Echo outpatient. Can follow with PCP for concern for median arcuate ligament syndrome            I spent 60 minutes in the professional and overall care of this patient.      Estefani Lynne, APRN-CNP

## 2023-12-19 NOTE — ED TRIAGE NOTES
Patient to ED via EMS from home for palpitations and pain between bilateral shoulder blades that started around 1700. Patient denies dyspnea/cough/cardiac hx.

## 2023-12-19 NOTE — PROGRESS NOTES
Pharmacy Medication History Review    Linda Gomez is a 68 y.o. female admitted for Palpitations. Pharmacy reviewed the patient's [id number] medications and allergies for accuracy.    The list below reflectives the updated PTA list. Please review each medication in order reconciliation for additional clarification and justification.  Prior to Admission Medications   Prescriptions Last Dose Informant Patient Reported? Taking?   albuterol 90 mcg/actuation inhaler More than a month  Yes No   Sig: Inhale 2 puffs every 4 hours if needed.   calcium carbonate (Oscal) 500 mg calcium (1,250 mg) tablet 12/17/2023  Yes No   Sig: Take 2 tablets (2,500 mg) by mouth once daily.   cetirizine (ZyrTEC) 10 mg capsule   Yes No   Sig: Take 1 capsule (10 mg) by mouth if needed (FOR SEASONAL ALLERGIES).   fluticasone (Flonase) 50 mcg/actuation nasal spray   Yes No   Sig: Administer 1 spray into each nostril once daily. FOR SEASONAL ALLERGIES   fluticasone furoate (Arnuity Ellipta) 100 mcg/actuation inhaler 12/18/2023  No No   Sig: Inhale 1 puff once daily. Rinse mouth with water after use to reduce aftertaste and incidence of candidiasis. Do not swallow.      Facility-Administered Medications: None         The list below reflectives the updated allergy list. Please review each documented allergy for additional clarification and justification.  Allergies  Reviewed by Gilda Amaya RN on 12/18/2023        Severity Reactions Comments    Amoxicillin-pot Clavulanate Not Specified Unknown     Cat Dander Not Specified Other itchy nose    Dog Hair Standardized Allergenic Extract Not Specified Other tchy nose    Pollen Extracts Not Specified Other sneezing, runny nose    Ragweed Not Specified Other sneezing, runny nose    Sulfasalazine Not Specified Other             Below are additional concerns with the patient's PTA list.  Patient verified all medications and doses.    Nicolette Montgomery CPhT

## 2023-12-19 NOTE — DISCHARGE SUMMARY
Discharge Diagnosis  Palpitations    Issues Requiring Follow-Up  Cardiology     Discharge Meds     Your medication list        CONTINUE taking these medications        Instructions Last Dose Given Next Dose Due   albuterol 90 mcg/actuation inhaler           Arnuity Ellipta 100 mcg/actuation inhaler  Generic drug: fluticasone furoate           Arnuity Ellipta 100 mcg/actuation inhaler  Generic drug: fluticasone furoate      Inhale 1 puff once daily. Rinse mouth with water after use to reduce aftertaste and incidence of candidiasis. Do not swallow.       calcium carbonate 500 mg calcium (1,250 mg) tablet  Commonly known as: Oscal           fluticasone 50 mcg/actuation nasal spray  Commonly known as: Flonase           ZyrTEC 10 mg capsule  Generic drug: cetirizine                    Test Results Pending At Discharge  Pending Labs       Order Current Status    Urine Culture In process            Hospital Course     Linda Gomez is a 68 y.o. female presenting with palpitations  and dizziness.  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  She reports that yesterday she was wrapping presents, going up and down the stairs.  Later on she was sitting down with her  on the couch doing [place] time word games.  She then started noticing palpitations.  She also endorses a pain that started yesterday between her shoulder blades.  She initially waited about 45 minutes from onset of palpitations, but due to palpitations persisting she called EMS.  She also endorses that over the past 2 weeks she has had a left flank pain.  She is not believe it is muscular skeletal as it is not reproducible.  She also endorses a decreased appetite over the past 1 to 2 weeks.  She does endorse that yesterday she did skip lunch.  She reports she is also not the best at drinking adequate fluids.  She denies any abdominal pain.  But does endorse over the past 2 weeks she has noticed more burping and a sour  taste in her mouth, leading her to wonder if this is GI related.     Palpitations  -tele-> SR with PVC's and palpiatations   -cardiology consulted      L Flank pain/ belching  -related to median arcuate ligament syndrome? Having no abdominal pain which is atypical      DVT prophylaxis:  Lovenox, SCD     DC plan:  DC home when medically stable     Labs/Testing reviewed     Interdisciplinary team rounding completed with hospitalist, nurse, TCC     NP discussed plan and lab/testing results with Dr. Kinsey. Cardiology cleared for DC, f/u Dr. Pink in 4-8 weeks, PRN metoprolol for palpitations a DC. Echo outpatient. Can follow with PCP for concern for median arcuate ligament syndrome     Pertinent Physical Exam At Time of Discharge  Physical Exam    Outpatient Follow-Up  Future Appointments   Date Time Provider Department Center   12/22/2023 12:00 PM Jimmy Tirado MD [id number]   12/27/2023  2:00 PM Linda Keating DO [id number] [facility]   1/3/2024  1:30 PM [facility] [place] [facility] [place] Lynne, APRN-CNP

## 2023-12-19 NOTE — CONSULTS
Inpatient consult to Cardiology  Consult performed by: Ulices Kincaid MD  Consult ordered by: Cherie Sloan PA-C        History Of Present Illness:    Linda Gomez is a 68 y.o. female presenting with palpitations.    -USOH until 1 month ago  -1 month ago had left flank pain, near constant, better with leaning backawards, no change (better or worse with palpitations)  -1-2 days ago had tightness in upper back, no radiation, no positional component, no associated dyspnea/chest discomfort/diaphoresis, no change with exertion  -exercises regularly, in the past month has gone on 1h long hikes with [employer], no anginal or HF symptoms or exercise intolerance     Last Recorded Vitals:  Vitals:    12/19/23 0045 12/19/23 0100 12/19/23 0241 12/19/23 0746   BP:  122/66 144/58 137/78   BP Location:   Right arm Left arm   Patient Position:   Sitting Lying   Pulse: 77 77 73 71   Resp: 20 26 18 18   Temp:   36.3 °C (97.3 °F)    TempSrc:   Oral    SpO2: 96% 96% 99% 97%   Weight:   67.1 kg (148 lb)    Height:           Last Labs:  CBC - 12/19/2023:  5:54 AM  7.3 12.9 270    40.3      CMP - 12/19/2023:  5:55 AM  9.2 9.1 27 --- 0.6   _ 5.2 14 67      PTT - No results in last year.  _   _ _     Troponin I, High Sensitivity   Date/Time Value Ref Range Status   12/18/2023 10:40 PM 10 0 - 13 ng/L Final   12/18/2023 07:45 PM 6 0 - 13 ng/L Final     BNP   Date/Time Value Ref Range Status   05/02/2019 08:04 PM 53 0 - 99 pg/mL Final     Comment:     .  <100 pg/mL - Heart failure unlikely  100-299 pg/mL - Intermediate probability of acute heart  .               failure exacerbation. Correlate with clinical  .               context and patient history.    >=300 pg/mL - Heart Failure likely. Correlate with clinical  .               context and patient history.  BNP testing is performed using different testing   methodology at [facility] ([facility]] than at other   [facility] hospitals. Direct result comparisons should   only be made within  "the same method.       VLDL   Date/Time Value Ref Range Status   12/13/2021 11:30 AM 13 0 - 40 mg/dL Final   06/21/2019 11:43 AM 15 0 - 40 mg/dL Final   05/15/2018 07:40 AM 25 0 - 40 mg/dL Final      Last I/O:  I/O last 3 completed shifts:  In: 500 (7.4 mL/kg) [IV Piggyback:500]  Out: - (0 mL/kg)   Weight: 67.1 kg     Past Cardiology Tests (Last 3 Years):  EKG:  ECG 12 lead 12/18/2023 (Preliminary)      ECG 12 lead 12/18/2023 (Preliminary)    Echo:  No results found for this or any previous visit from the past 1095 days.    Ejection Fractions:  No results found for: \"EF\"  Cath:  No results found for this or any previous visit from the past 1095 days.    Stress Test:  No results found for this or any previous visit from the past 1095 days.    Cardiac Imaging:  No results found for this or any previous visit from the past 1095 days.      Past Medical History:  She has a past medical history of Personal history of other diseases of the musculoskeletal system and connective tissue.    Past Surgical History:  She has a past surgical history that includes Other surgical history (01/30/2014); Other surgical history (01/30/2014); Knee arthroscopy w/ debridement (01/30/2014); Rotator cuff repair (01/30/2014); and Ankle surgery (01/13/2015).      Social History:  She reports that she has never smoked. She has never used smokeless tobacco. She reports current alcohol use of about 3.0 standard drinks of alcohol per week. She reports that she does not use drugs.    Family History:  Family History   Problem Relation Name Age of Onset    Other (esophageal ulcer) Mother      Osteoporosis Mother      Transient ischemic attack Mother      Diabetes Father      Drug abuse Father      Hypertension Father      Cataracts Sister      Diabetes Sister      Hypothyroidism Sister      Lung cancer Sister          Allergies:  Amoxicillin-pot clavulanate, Cat dander, Dog hair standardized allergenic extract, Pollen extracts, Ragweed, and " Sulfasalazine    Inpatient Medications:  Scheduled medications   Medication Dose Route Frequency    enoxaparin  40 mg subcutaneous q24h    melatonin  3 mg oral Daily    pantoprazole  40 mg oral Daily before breakfast    Or    pantoprazole  40 mg intravenous Daily before breakfast     PRN medications   Medication    acetaminophen    docusate sodium     Continuous Medications   Medication Dose Last Rate     Outpatient Medications:  Current Outpatient Medications   Medication Instructions    albuterol 90 mcg/actuation inhaler 2 puffs, inhalation, Every 4 hours PRN    Arnuity Ellipta 100 mcg/actuation inhaler 1 puff, inhalation, Daily    calcium carbonate (Oscal) 500 mg calcium (1,250 mg) tablet 2 tablets, oral, Daily    cetirizine (ZYRTEC) 10 mg, oral, As needed    fluticasone (Flonase) 50 mcg/actuation nasal spray 1 spray, Each Nostril, Daily    fluticasone furoate (Arnuity Ellipta) 100 mcg/actuation inhaler 1 puff, inhalation, Daily, Rinse mouth with water after use to reduce aftertaste and incidence of candidiasis. Do not swallow.       Physical Exam:      Physical Exam:  Constitutional: chronically-ill appearing, NAD  Eyes: no conjunctival injection, EOMI  ENT: MMM, no apparent injury  Head/Neck: Neck supple, no apparent injury  Respiratory/Thorax: Patent airways, CTAB, normal WOB  Cardiovascular: normal rate, regular rhythm, no murmur, normal S1 and S2, JVP not elevated, extremities warm, trace ALESSANDRA  Gastrointestinal: Non-distended, soft, no tenderness  Extremities: warm, intact  Neurological: grossly intact, no focal deficits  Skin: Warm and dry, no lesions, no rashes       Assessment/Plan       68F hx ulcerative colitis in remission, cough-variant asthma, history of PVC's p/w 1 day palpitations found to have PVC's.        Impression:  #frequent PVC's    Has at least 2 morphologies PVC on ECG (PVC 1 - superior axis, RBB morphology, trasnition V2-V3, PVC 2 inferior axis, RBB morphology, concordant). With good  functional capacity do not suspect decompensated PVC-induced cardiomypathy or inflammatory cardiomyopathy - can proceed with outpatient evaluation with TTE to rule-out structural heart disease and PRN BB given symptomatic episodes. No event monitor for now given low suspicion for PVC cardiomyopathy based on low PVC burden on telemetry, revisit if has low EF.      Recommendations:  -no cardiac barriers to discharge  -Dr. Pink's office to arrange outpatient echocardiogram  -please prescribe PRN metoprolol tartrate 25mg to take as needed for palpitations  -follow-up with Dr. Pink in 4-8 weeks       Thank you for the opportunity to contribute to the care of this patient. Above recommendations discussed with Dr. Pink.      Peripheral IV 12/18/23 20 G Right Antecubital (Active)   Site Assessment Clean;Dry;Intact 12/19/23 0800   Dressing Status Clean;Dry 12/19/23 0800   Number of days: 1       Code Status:  Full Code    I spent 35 minutes in the professional and overall care of this patient.        Ulices Kincaid MD

## 2023-12-19 NOTE — ED PROVIDER NOTES
HPI   Chief Complaint   Patient presents with    Palpitations       HPI  Patient is a bridgett 68-year-old female with past medical history significant for ulcerative colitis, hyperlipidemia, osteoporosis, hyperactive airway disease presents emergency room with a chief complaint of palpitations, chest discomfort and back pain.  Patient is a retired nurse practitioner.  She states that since this morning she has felt discomfort that she felt was not muscular in between her shoulder blades.  At around 5 PM she started having significant palpitations associated with some lightheadedness.  Now the discomfort between her shoulder blades is radiating to her chest.  She is also been experiencing left flank pain for 2 weeks.  She does not feel like any of this is muscular.  She endorses some gas and feeling burning and a sour taste in her mouth and tried some Pepcid but it did not work.  She denies any nausea, vomiting, diarrhea.  The pain is not significant but concerning.  She denies any accompanying neurologic deficits including focal weakness, sensory deficits.  She denies any cardiac history other than occasional PVCs.      PMHx: As above  PSHx: Denies pertinent  FamilyHx: Denies pertinent  SocialHx: Denies  Allergies: Per EMR  Medications: See Medication Reconciliation     ROS  As above but otherwise denies  Physical Exam    GENERAL: Awake and Alert, No Acute Distress  HEENT: AT/NC, PERRL, EOMI, Normal Oropharynx, No Signs of Dehydration  NECK: Normal Inspection, No JVD  CARDIOVASCULAR: RRR, No M/R/G  RESPIRATORY: CTA Bilaterally, No Wheezes, Rales or Rhonchi, Chest Wall Non-tender  ABDOMEN: Soft, non-tender abdomen, Normal Bowel Sounds, No Distention  BACK: No CVA Tenderness  SKIN: Normal Color, Warm, Dry, No Rashes   EXTREMITIES: Non-Tender, Full ROM, No Pedal Edema  NEURO: A&O x 3, Normal Motor and Sensation, Normal Mood and Affect    Nursing Assessment and Vitals Reviewed    EKG on arrival showed a sinus rhythm at  79 bpm.  There are no significant interval prolongations or ischemic ST changes.  There are T wave inversions isolated to lead III.  Repeat EKG showing sinus rhythm with occasional PVCs.  No axis deviation.  No ischemic ST changes.      Medical Decision  Patient seen and evaluated for multiple complaints as described above including palpitations, lightheadedness, pain between the shoulder blades radiating to the chest.  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  On evaluation patient is well-appearing and in no acute distress.  She is persistently in bigeminy on the monitor although it was not caught on EKG.  Her lungs are clear and her heart is regular.  She has good equal pulses in bilateral upper extremities.  No sensory deficits.  No motor deficits.  Given her history, complaints and risk factors we will perform a workup to include a CTA.    Workup included labs that revealed slight elevation in her creatinine to 1.10 up from 0.79.  Her calcium is slightly increased.  Her TSH is elevated but T4 is normal.  She has no leukocytosis or anemia.  Troponin is negative x 2.  Her urine shows some large leukocytes but patient is asymptomatic we will send for culture.  CTA showed no aortic aneurysm or dissection.  There is focal narrowing at the proximal celiac artery with poststenotic dilatation, however, patient denies any symptoms consistent with median arcuate ligament syndrome.  She also has a hiatal hernia and diverticulosis.  On reevaluation patient is persistently in bigeminy and continues to feel palpitations.  She does feel some chest discomfort although denies significant chest pain.  Repeat EKG showing PVCs without ischemic ST changes.  Given patient ongoing symptoms and concern will admit for observation and likely cardiac consult.                                [place] Coma Scale Score: 15                  Patient History   Past Medical  History:   Diagnosis Date    Personal history of other diseases of the musculoskeletal system and connective tissue     History of osteoarthritis     Past Surgical History:   Procedure Laterality Date    ANKLE SURGERY  01/13/2015    Ankle Surgery    KNEE ARTHROSCOPY W/ DEBRIDEMENT  01/30/2014    Arthroscopy Knee Right    OTHER SURGICAL HISTORY  01/30/2014    Reported Hx Of Hip Replacement - Right Side    OTHER SURGICAL HISTORY  01/30/2014    Treatment Of Knee Fracture Patellar, Open    ROTATOR CUFF REPAIR  01/30/2014    Rotator Cuff Repair     Family History   Problem Relation Name Age of Onset    Other (esophageal ulcer) Mother      Osteoporosis Mother      Transient ischemic attack Mother      Diabetes Father      Drug abuse Father      Hypertension Father      Cataracts Sister      Diabetes Sister      Hypothyroidism Sister      Lung cancer Sister       Social History     Tobacco Use    Smoking status: Never    Smokeless tobacco: Never   Vaping Use    Vaping Use: Never used   Substance Use Topics    Alcohol use: Yes     Alcohol/week: 3.0 standard drinks of alcohol     Types: 3 Glasses of wine per week    Drug use: Never       Physical Exam   ED Triage Vitals [12/18/23 1901]   Temp Heart Rate Resp BP   36 °C (96.8 °F) 84 16 141/82      SpO2 Temp src Heart Rate Source Patient Position   99 % -- -- --      BP Location FiO2 (%)     -- --       Physical Exam    ED Course & MDM   Diagnoses as of 12/18/23 2337   Bigeminy   Chest pain, unspecified type   Palpitations       Medical Decision Making      Procedure  Procedures     Inocencia Loya MD  12/18/23 8907

## 2023-12-19 NOTE — DISCHARGE INSTRUCTIONS
Please follow with your PCP upon discharge as hospital follow up.  Please follow-up with cardiology in 4 to 8 weeks, phone number listed above.  An outpatient echo was ordered to be completed.

## 2023-12-19 NOTE — PROGRESS NOTES
12/19/23 1206   Discharge Planning   Living Arrangements Spouse/significant other   Support Systems Spouse/significant other   Assistance Needed Independent in ADLS/ IADLS, Ambulatory without assitive devices. No hemodialysis , no home oxygen and no DME.   Type of Residence Private residence   Home or Post Acute Services None   Patient expects to be discharged to: Home   Does the patient need discharge transport arranged? Yes   RoundTrip coordination needed? Yes     Laney BOLDEN RN [facility] CCM

## 2023-12-20 ENCOUNTER — PATIENT OUTREACH (OUTPATIENT)
Dept: PRIMARY CARE | Facility: CLINIC | Age: 68
End: 2023-12-20

## 2023-12-20 ENCOUNTER — APPOINTMENT (OUTPATIENT)
Dept: OPHTHALMOLOGY | Facility: CLINIC | Age: 68
End: 2023-12-20
Payer: MEDICARE

## 2023-12-20 LAB — BACTERIA UR CULT: NORMAL

## 2023-12-20 NOTE — PROGRESS NOTES
Discharge Facility: [facility]  Discharge Diagnosis: Palpitations  Admission Date: 18 Dec 23  Discharge Date: 19 Dec 23    PCP Appointment Date: 27 Dec 23  Specialist Appointment Date: 22 Dec 23 (ENT, [facility])  Hospital Encounter and Summary: Linked    See discharge assessment below for further details     Engagement  Call Start Time: 1042 (12/20/2023 10:52 AM)    Medications  Medications reviewed with patient/caregiver?: Yes (12/20/2023 10:52 AM)  Is the patient having any side effects they believe may be caused by any medication additions or changes?: No (12/20/2023 10:52 AM)  Does the patient have all medications ordered at discharge?: Yes (12/20/2023 10:52 AM)  Prescription Comments: None (12/20/2023 10:52 AM)  Is the patient taking all medications as directed (includes completed medication regime)?: Yes (12/20/2023 10:52 AM)  Medication Comments: Pt stating that she has no questions, concerns, or issues with medications at this time. (12/20/2023 10:52 AM)    Appointments  Does the patient have a primary care provider?: Yes (follow up set by another for 27 Dec 23) (12/20/2023 10:52 AM)  Care Management Interventions: Verified appointment date/time/provider (12/20/2023 10:52 AM)  Has the patient kept scheduled appointments due by today?: Yes (12/20/2023 10:52 AM)  Care Management Interventions: Advised patient to keep appointment (12/20/2023 10:52 AM)    Self Management  What is the home health agency?: N/A (12/20/2023 10:52 AM)  Has home health visited the patient within 72 hours of discharge?: Not applicable (12/20/2023 10:52 AM)  What Durable Medical Equipment (DME) was ordered?: N/A (12/20/2023 10:52 AM)    Patient Teaching  Does the patient have access to their discharge instructions?: Yes (12/20/2023 10:52 AM)  Care Management Interventions: Reviewed instructions with patient (12/20/2023 10:52 AM)  What is the patient's perception of their health status since discharge?: Improving (12/20/2023 10:52 AM)  Is the  patient/caregiver able to teach back the hierarchy of who to call/visit for symptoms/problems? PCP, Specialist, Home Health nurse, Urgent Care, ED, 911: Yes (12/20/2023 10:52 AM)  Patient/Caregiver Education Comments: None (12/20/2023 10:52 AM)    Wrap Up  Wrap Up Additional Comments: None (12/20/2023 10:52 AM)  Call End Time: 1052 (12/20/2023 10:52 AM)     Pt grateful for outreach call and is agreeable to being contacted after PCP appt to see how she is doing.

## 2023-12-21 ENCOUNTER — APPOINTMENT (OUTPATIENT)
Dept: OTOLARYNGOLOGY | Facility: CLINIC | Age: 68
End: 2023-12-21
Payer: MEDICARE

## 2023-12-21 NOTE — PROGRESS NOTES
[facility]   12/22/23 - Patient presents for followup. She reports doing well with the sinuses/nose. She stopped using Flonase this December as discussed during last visit. She denies any nasal obstruction or postnasal drip. While on a hike in [place] in September 2023, she reports hitting her nose, which caused a bloody nose on the left side. She denies any nasal fracture or broken nose from this. She also notes recovering well from this. She also stopped Zyrtec, which caused annoying itching, which has now subsided.    She is allergic to amoxicillin-pot clavulanate, cat dander, dog hair standardized allergenic extract, pollen extracts, ragweed, and sulfasalazine.    Per initial note 1/6/2023:  Linda Gomez is a 67 year old female presenting with concerns of sinus and nose problems that began this past summer. She describes a post viral episode of sinusitis and was prescribed a z-pack which did not resolve her symptoms greatly. She did receive a 2nd course of antibiotics (doxycycline for 10 days) with overall improvement. She was told to do saline rinses and Flonase religiously which she did for at least 1 month. Flonase provided no benefit. She reports continued waxing and waning of symptoms of significant facial pressure/pain on the right and a foul smell coming from the nose. Denies nasal obstruction. No dental issues reported. Mild post nasal drip. No nose bleeds. No significant history of recurrent sinus infections. She continues to use saline spray but no longer using Flonase or rinses. She has a history of allergic rhinitis or allergies. Occasionally, she takes Zyrtec in spring, fall. Prior allergy testing - dogs, cats, trees, grasses. She denies a history of aspirin sensitivity.   She also has asthma and utilizes an inhaler - there was a test performed by pulmonology which was elevated, which is an indicator of eosinophilic inflammation.    History of prior nasal/sinus surgery or procedure: Denies     Review of  Systems   Negative for constitutional, eyes, cardiac, pulmonary, hepatic, renal, digestive, hematologic, epileptic, syncopal, musculoskeletal, mental health, integumentary, hypertensive, lipid, arthritic, diabetic, thyroid or neurologic disorders (except as listed in the HPI, PMH and Problem List).       Assessment   Linda Gomez is a 68 y.o. female with h/o atopy (asthma, allergic rhinitis) and now 6+ month symptoms of right-sided facial pain/pressure with recurrent foul smell s/p an acute sinusitis episode this past summer. Clinical exam and endoscopy showed edema and large polyp obstructing the right middle meatus with post obstructive muco-purulence appreciated consistent with chronic rhinosinusitis on at least the right. Right septal deviation posteriorly.  1/6/23 - Rx doxy x 3 weeks, prednisone taper, Restart Flonase. CT ordered.  2/1/23 - Continue Flonase. Overall much improved symptomatically. No infection and polyp has abated into sinuses. Slight nasal dryness anteriorly. CT with moderate pansinus disease, DNS, ITH.  5/3/23 - No reemergence of polyp. Pt instructed to continue Flonase until the beginning of the winter season in Dec 2023. Reassess need for Flonase at that time.  12/22/23 - Pt reports hit to the nose in Sept 2023, resolved. No evidence of polyp again. Sinuses PRISTINE. No polypoid edema. She can try staying off Flonase and all other nasal treatments.    Plan   I personally reviewed the note from Dr. Brett Keating from pulmonology from 12/7/23. This is contributing to my history, assessment, and plan: The patient was noted to have cough variant asthma, which is well controlled.    I previously reviewed the patient's CT scan images and results. I discussed the results personally with the patient. The following findings were discussed: CT Sinus: Jan 30, 2023: Shows pansinus thickening, worse on the right with opacification of the right anterior ethmoid air cells. She has a right septal  deviation and bilateral inferior turbinate hypertrophy.    Reassurance and counseling provided to patient and her condition was extensively discussed including as noted below: Previously counseled patient on natural history of chronic sinusitis and that we will need to ensure long term management of inflammation and treat any exacerbations.    Nasal endoscopy. Findings: as noted.  Patient may continue staying off Flonase and all other nasal treatments.  Patient was instructed to start doing nasal saline irrigations twice daily if she develops thick yellow green drainage or facial pressure. If she continues to have thick yellow green drainage or facial pressure for 7-10 days, she was instructed to call our office or her PCP for an antibiotic prescription.  Follow up with me as needed.    Referring Provider: Be MORENO, Angel Luis SZYMANSKI will provide a report to the referring provider via the electronic medical record or US mail.     Jimmy Tirado MD [facility]  Division of Rhinology, Sinus, and Skull Base Surgery       Exam   General: this is a healthy appearing female who appears her stated age. The patient is alert and appropriately verbally conversant without hoarseness.     Face: The face was inspected and no cutaneous masses or lesions were visualized. There was no erythema or edema noted. Facial movement was symmetric without weakness. No skin lesions were detected. There was no sinus tenderness elicited. The parotid and submandibular glands were normal to palpation.     Eyes: Extra-ocular muscle function was intact. No nystagmus was observed. Pupils were equal.     Cranial Nerves: Cranial nerves II, III, IV, and VI were noted to be intact via extra-ocular muscle movement testing. Cranial nerve VII noted to be intact and symmetric by facial movement.     Nose: Examination of the nose revealed the nasal dorsum to be midline. Intranasal exam reveals the septum is not deviated anteriorly. The inferior turbinates were  without abnormality - no hypertrophy. Mild dryness. No masses, polyps, mucopus, or other lesion on anterior rhinoscopy. See below procedure note as applicable for further exam.     Procedure Note:  Procedure: Nasal endoscopy - diagnostic  Indication: concern for chronic sinusitis  Informed consent obtained: risks, benefits, alternatives, and expectations discussed with patient and the patient wishes to proceed.     Findings: After topical decongestion with decongestant and anesthetic spray, nasal endoscopy was performed using an endoscope. The septum was midline. The inferior turbinates were normal. The middle turbinates appeared healthy, the middle meatus is free of purulence, masses, lesions or polyps - no evidence of polyp again. The superior meatus and sphenoethmoid recess are clear bilaterally. The nasal passageway is patent. The nasopharynx was clear. There were no complications and the patient tolerated the procedure well.       Scribe Attestation  By signing my name below, I, Zachary Ross, attest that this documentation has been prepared under the direction and in the presence of Jimmy Tirado MD. All medical record entries made by the Scribe were at my direction or personally dictated by me. I have reviewed the chart and agree that the record accurately reflects my personal performance of the history, physical exam, discussion and plan.

## 2023-12-21 NOTE — PATIENT INSTRUCTIONS
Your sinuses look good today. You may try staying off Flonase.    If you develop thick yellow green drainage or facial pressure, please start doing nasal saline irrigations twice daily for about 5 days. This can sometimes prevent a full-blown sinus infection from developing. If you continue to have thick yellow green drainage or facial pressure for 5-10 days, please contact our office by calling [phone] or your PCP for an antibiotic.     Please follow up with me as needed. Feel free to contact my office at [phone] with any questions.    Scribe Attestation  By signing my name below, I, Zachary Ross, attest that this documentation has been prepared under the direction and in the presence of Jimmy Tirado MD. All medical record entries made by the Scribe were at my direction or personally dictated by me. I have reviewed the chart and agree that the record accurately reflects my personal performance of the history, physical exam, discussion and plan.

## 2023-12-22 ENCOUNTER — OFFICE VISIT (OUTPATIENT)
Dept: OTOLARYNGOLOGY | Facility: CLINIC | Age: 68
End: 2023-12-22
Payer: MEDICARE

## 2023-12-22 VITALS — TEMPERATURE: 97.2 F | WEIGHT: 149.38 LBS | BODY MASS INDEX: 23.45 KG/M2 | HEIGHT: 67 IN

## 2023-12-22 DIAGNOSIS — J32.0 CHRONIC MAXILLARY SINUSITIS: Primary | ICD-10-CM

## 2023-12-22 DIAGNOSIS — J33.8 POLYP OF NASAL SINUS: ICD-10-CM

## 2023-12-22 PROCEDURE — 1159F MED LIST DOCD IN RCRD: CPT | Performed by: OTOLARYNGOLOGY

## 2023-12-22 PROCEDURE — 99213 OFFICE O/P EST LOW 20 MIN: CPT | Performed by: OTOLARYNGOLOGY

## 2023-12-22 PROCEDURE — 1036F TOBACCO NON-USER: CPT | Performed by: OTOLARYNGOLOGY

## 2023-12-22 PROCEDURE — 1126F AMNT PAIN NOTED NONE PRSNT: CPT | Performed by: OTOLARYNGOLOGY

## 2023-12-22 PROCEDURE — 31231 NASAL ENDOSCOPY DX: CPT | Performed by: OTOLARYNGOLOGY

## 2023-12-22 PROCEDURE — 1160F RVW MEDS BY RX/DR IN RCRD: CPT | Performed by: OTOLARYNGOLOGY

## 2023-12-22 ASSESSMENT — PATIENT HEALTH QUESTIONNAIRE - PHQ9
SUM OF ALL RESPONSES TO PHQ9 QUESTIONS 1 AND 2: 0
2. FEELING DOWN, DEPRESSED OR HOPELESS: NOT AT ALL
1. LITTLE INTEREST OR PLEASURE IN DOING THINGS: NOT AT ALL

## 2023-12-27 ENCOUNTER — OFFICE VISIT (OUTPATIENT)
Dept: PRIMARY CARE | Facility: CLINIC | Age: 68
End: 2023-12-27
Payer: MEDICARE

## 2023-12-27 VITALS
WEIGHT: 150.8 LBS | DIASTOLIC BLOOD PRESSURE: 62 MMHG | SYSTOLIC BLOOD PRESSURE: 124 MMHG | BODY MASS INDEX: 23.98 KG/M2 | TEMPERATURE: 97.1 F | OXYGEN SATURATION: 97 % | HEART RATE: 78 BPM

## 2023-12-27 DIAGNOSIS — N17.9 AKI (ACUTE KIDNEY INJURY) (CMS-HCC): ICD-10-CM

## 2023-12-27 DIAGNOSIS — R00.2 PALPITATIONS: Primary | ICD-10-CM

## 2023-12-27 DIAGNOSIS — I77.4 MEDIAN ARCUATE LIGAMENT SYNDROME (CMS-HCC): ICD-10-CM

## 2023-12-27 DIAGNOSIS — R79.89 ELEVATED TSH: ICD-10-CM

## 2023-12-27 DIAGNOSIS — K21.9 GASTROESOPHAGEAL REFLUX DISEASE, UNSPECIFIED WHETHER ESOPHAGITIS PRESENT: ICD-10-CM

## 2023-12-27 PROCEDURE — 99214 OFFICE O/P EST MOD 30 MIN: CPT | Performed by: STUDENT IN AN ORGANIZED HEALTH CARE EDUCATION/TRAINING PROGRAM

## 2023-12-27 PROCEDURE — 1036F TOBACCO NON-USER: CPT | Performed by: STUDENT IN AN ORGANIZED HEALTH CARE EDUCATION/TRAINING PROGRAM

## 2023-12-27 PROCEDURE — 1160F RVW MEDS BY RX/DR IN RCRD: CPT | Performed by: STUDENT IN AN ORGANIZED HEALTH CARE EDUCATION/TRAINING PROGRAM

## 2023-12-27 PROCEDURE — 1126F AMNT PAIN NOTED NONE PRSNT: CPT | Performed by: STUDENT IN AN ORGANIZED HEALTH CARE EDUCATION/TRAINING PROGRAM

## 2023-12-27 PROCEDURE — 1159F MED LIST DOCD IN RCRD: CPT | Performed by: STUDENT IN AN ORGANIZED HEALTH CARE EDUCATION/TRAINING PROGRAM

## 2023-12-27 RX ORDER — OMEPRAZOLE 20 MG/1
20 CAPSULE, DELAYED RELEASE ORAL
Qty: 30 CAPSULE | Refills: 5 | Status: SHIPPED | OUTPATIENT
Start: 2023-12-27 | End: 2024-06-24

## 2023-12-27 ASSESSMENT — ENCOUNTER SYMPTOMS
NAUSEA: 0
CHILLS: 0
CONSTIPATION: 0
FATIGUE: 0
DIZZINESS: 0
FEVER: 0
NUMBNESS: 0
NERVOUS/ANXIOUS: 0
HEADACHES: 0
PALPITATIONS: 0
DYSURIA: 0
WHEEZING: 0
COUGH: 0
ABDOMINAL PAIN: 0
SHORTNESS OF BREATH: 0
DYSPHORIC MOOD: 0
HEMATURIA: 0
FREQUENCY: 0
DIARRHEA: 0

## 2023-12-27 ASSESSMENT — PATIENT HEALTH QUESTIONNAIRE - PHQ9
1. LITTLE INTEREST OR PLEASURE IN DOING THINGS: NOT AT ALL
2. FEELING DOWN, DEPRESSED OR HOPELESS: NOT AT ALL
SUM OF ALL RESPONSES TO PHQ9 QUESTIONS 1 AND 2: 0

## 2023-12-27 NOTE — PATIENT INSTRUCTIONS
Will have you recheck thyroid in about 4 to 6 weeks, can get cholesterol done at the same time  Prescription for omeprazole sent to pharmacy, try it for 2 weeks and see how you do with that.  Can consider talking with your gastroenterologist as well  Follow-up with cardiology as scheduled

## 2023-12-27 NOTE — PROGRESS NOTES
Subjective   Patient ID: Linda Gomez is a 68 y.o. female who presents for GERD (With burping), Flank Pain (Left side has since been getting better), Hospital Follow-up (She was in the ER 12/18/23 for Bigeminy, CP and Palpitations ), Shoulder Pain (Still having shoulder pain between her shoulder blades), and Knee Pain (Left knee pain after a fall.).    HPI   A few weeks ago she was having L flank pain and increased belching. A week ago the pain moved between her shoulder blades. She tried rolaids. She had decreased appetite. She normally grazes. She was having sour taste in her mouth as well.  A lot of it has gotten better.   Last week she went to the ED with chest pain and palpitations. She was sitting and all of a sudden started having palpitations for 45 minutes. She was feeling lightheaded. Hx of bigeminy. Admitted, cardiology felt could do prn beta blocker and f/up with outpatient TTE.   CT angio showed possible median arcuate ligament syndrome.     She is still having belching. Is not on a PPI.     Review of Systems   Constitutional:  Negative for chills, fatigue and fever.   Respiratory:  Negative for cough, shortness of breath and wheezing.    Cardiovascular:  Negative for chest pain, palpitations and leg swelling.   Gastrointestinal:  Negative for abdominal pain, constipation, diarrhea and nausea.   Genitourinary:  Negative for dysuria, frequency, hematuria and urgency.   Neurological:  Negative for dizziness, numbness and headaches.   Psychiatric/Behavioral:  Negative for dysphoric mood. The patient is not nervous/anxious.        Objective   /62 (BP Location: Left arm, Patient Position: Sitting)   Pulse 78   Temp 36.2 °C (97.1 °F) (Temporal)   Wt 68.4 kg (150 lb 12.8 oz)   SpO2 97%   BMI 23.98 kg/m²     Physical Exam  Constitutional:       Appearance: Normal appearance.   HENT:      Head: Normocephalic and atraumatic.   Eyes:      Extraocular Movements: Extraocular movements intact.       Pupils: Pupils are equal, round, and reactive to light.   Cardiovascular:      Rate and Rhythm: Normal rate and regular rhythm.      Heart sounds: Normal heart sounds. No murmur heard.  Pulmonary:      Effort: Pulmonary effort is normal.      Breath sounds: Normal breath sounds. No wheezing.   Abdominal:      General: Bowel sounds are normal.      Palpations: Abdomen is soft.      Tenderness: There is no abdominal tenderness. There is no guarding.   Musculoskeletal:         General: Normal range of motion.   Skin:     General: Skin is warm and dry.   Neurological:      General: No focal deficit present.      Mental Status: She is alert and oriented to person, place, and time.   Psychiatric:         Mood and Affect: Mood normal.         Behavior: Behavior normal.         Assessment/Plan   Problem List Items Addressed This Visit       Palpitations - Primary    Relevant Orders    TSH with reflex to Free T4 if abnormal     Other Visit Diagnoses       Gastroesophageal reflux disease, unspecified whether esophagitis present        Relevant Medications    omeprazole (PriLOSEC) 20 mg DR capsule    Median arcuate ligament syndrome (CMS/HCC)        Elevated TSH        Relevant Orders    TSH with reflex to Free T4 if abnormal    KAMERON (acute kidney injury) (CMS/HCC)              She is going to follow with cardiology, will get that appointment and already has an appointment for her echo.  TSH elevated in hospital, will recheck that in about 4 to 6 weeks, no history of thyroid issues in the past  She did have an KAMERON that has since resolved  I am going to touch base with vascular medicine about the possible median arcuate ligament syndrome       Patient understands and agrees with treatment plan    Linda Keating, DO   12/27/23

## 2023-12-28 ENCOUNTER — PATIENT OUTREACH (OUTPATIENT)
Dept: PRIMARY CARE | Facility: CLINIC | Age: 68
End: 2023-12-28
Payer: MEDICARE

## 2023-12-28 NOTE — PROGRESS NOTES
Unable to reach patient for call back after patient's follow up appointment with PCP.   ANJELICAM with call back number for patient to call if needed   If no voicemail available call attempts x 2 were made to contact the patient to assist with any questions or concerns patient may have.

## 2024-01-02 ENCOUNTER — HOSPITAL ENCOUNTER (OUTPATIENT)
Dept: RADIOLOGY | Facility: EXTERNAL LOCATION | Age: 69
Discharge: HOME | End: 2024-01-02

## 2024-01-03 ENCOUNTER — ANCILLARY PROCEDURE (OUTPATIENT)
Dept: RADIOLOGY | Facility: CLINIC | Age: 69
End: 2024-01-03
Payer: MEDICARE

## 2024-01-03 DIAGNOSIS — Z12.31 BREAST CANCER SCREENING BY MAMMOGRAM: ICD-10-CM

## 2024-01-03 PROCEDURE — 77063 BREAST TOMOSYNTHESIS BI: CPT | Mod: BILATERAL PROCEDURE | Performed by: RADIOLOGY

## 2024-01-03 PROCEDURE — 77067 SCR MAMMO BI INCL CAD: CPT

## 2024-01-03 PROCEDURE — 77067 SCR MAMMO BI INCL CAD: CPT | Mod: BILATERAL PROCEDURE | Performed by: RADIOLOGY

## 2024-01-05 ENCOUNTER — HOSPITAL ENCOUNTER (OUTPATIENT)
Dept: CARDIOLOGY | Facility: HOSPITAL | Age: 69
Discharge: HOME | End: 2024-01-05
Payer: MEDICARE

## 2024-01-05 VITALS
DIASTOLIC BLOOD PRESSURE: 62 MMHG | WEIGHT: 150 LBS | BODY MASS INDEX: 24.11 KG/M2 | SYSTOLIC BLOOD PRESSURE: 124 MMHG | HEIGHT: 66 IN

## 2024-01-05 DIAGNOSIS — I49.3 FREQUENT PVCS: ICD-10-CM

## 2024-01-05 LAB
AORTIC VALVE MEAN GRADIENT: 4.1
AORTIC VALVE PEAK VELOCITY: 1.37
AV PEAK GRADIENT: 7.5
AVA (PEAK VEL): 2.4
AVA (VTI): 2.39
EJECTION FRACTION APICAL 4 CHAMBER: 55.3
EJECTION FRACTION: 57
LEFT ATRIUM VOLUME AREA LENGTH INDEX BSA: 37.5
LEFT VENTRICLE INTERNAL DIMENSION DIASTOLE: 4.71 (ref 3.5–6)
LEFT VENTRICULAR OUTFLOW TRACT DIAMETER: 1.93
MITRAL VALVE E/A RATIO: 0.81
MITRAL VALVE E/E' RATIO: 7.5
RIGHT VENTRICLE FREE WALL PEAK S': 17
RIGHT VENTRICLE PEAK SYSTOLIC PRESSURE: 24.2
TRICUSPID ANNULAR PLANE SYSTOLIC EXCURSION: 2.4

## 2024-01-05 PROCEDURE — 93306 TTE W/DOPPLER COMPLETE: CPT

## 2024-01-05 PROCEDURE — 93306 TTE W/DOPPLER COMPLETE: CPT | Performed by: INTERNAL MEDICINE

## 2024-01-10 ENCOUNTER — APPOINTMENT (OUTPATIENT)
Dept: PULMONOLOGY | Facility: HOSPITAL | Age: 69
End: 2024-01-10
Payer: MEDICARE

## 2024-01-16 ENCOUNTER — PATIENT OUTREACH (OUTPATIENT)
Dept: PRIMARY CARE | Facility: CLINIC | Age: 69
End: 2024-01-16
Payer: MEDICARE

## 2024-02-09 ENCOUNTER — LAB (OUTPATIENT)
Dept: LAB | Facility: LAB | Age: 69
End: 2024-02-09
Payer: MEDICARE

## 2024-02-09 DIAGNOSIS — R00.2 PALPITATIONS: ICD-10-CM

## 2024-02-09 DIAGNOSIS — E78.00 ELEVATED LDL CHOLESTEROL LEVEL: ICD-10-CM

## 2024-02-09 DIAGNOSIS — R79.89 ELEVATED TSH: ICD-10-CM

## 2024-02-09 LAB
CHOLEST SERPL-MCNC: 204 MG/DL (ref 0–199)
CHOLESTEROL/HDL RATIO: 3.1
HDLC SERPL-MCNC: 66.1 MG/DL
LDLC SERPL CALC-MCNC: 121 MG/DL
NON HDL CHOLESTEROL: 138 MG/DL (ref 0–149)
TRIGL SERPL-MCNC: 87 MG/DL (ref 0–149)
TSH SERPL-ACNC: 2.48 MIU/L (ref 0.44–3.98)
VLDL: 17 MG/DL (ref 0–40)

## 2024-02-09 PROCEDURE — 80061 LIPID PANEL: CPT

## 2024-02-09 PROCEDURE — 84443 ASSAY THYROID STIM HORMONE: CPT

## 2024-02-09 PROCEDURE — 36415 COLL VENOUS BLD VENIPUNCTURE: CPT

## 2024-03-13 ENCOUNTER — OFFICE VISIT (OUTPATIENT)
Dept: PRIMARY CARE | Facility: CLINIC | Age: 69
End: 2024-03-13
Payer: MEDICARE

## 2024-03-13 VITALS
OXYGEN SATURATION: 99 % | DIASTOLIC BLOOD PRESSURE: 60 MMHG | HEART RATE: 66 BPM | BODY MASS INDEX: 24.23 KG/M2 | HEIGHT: 66 IN | WEIGHT: 150.8 LBS | SYSTOLIC BLOOD PRESSURE: 130 MMHG | TEMPERATURE: 97.2 F

## 2024-03-13 DIAGNOSIS — K51.20 ULCERATIVE PROCTITIS WITHOUT COMPLICATION (MULTI): ICD-10-CM

## 2024-03-13 DIAGNOSIS — K21.9 GASTROESOPHAGEAL REFLUX DISEASE, UNSPECIFIED WHETHER ESOPHAGITIS PRESENT: Primary | ICD-10-CM

## 2024-03-13 DIAGNOSIS — I77.4 MEDIAN ARCUATE LIGAMENT SYNDROME (CMS-HCC): ICD-10-CM

## 2024-03-13 PROCEDURE — 99213 OFFICE O/P EST LOW 20 MIN: CPT | Performed by: STUDENT IN AN ORGANIZED HEALTH CARE EDUCATION/TRAINING PROGRAM

## 2024-03-13 PROCEDURE — 1159F MED LIST DOCD IN RCRD: CPT | Performed by: STUDENT IN AN ORGANIZED HEALTH CARE EDUCATION/TRAINING PROGRAM

## 2024-03-13 PROCEDURE — 1036F TOBACCO NON-USER: CPT | Performed by: STUDENT IN AN ORGANIZED HEALTH CARE EDUCATION/TRAINING PROGRAM

## 2024-03-13 ASSESSMENT — ENCOUNTER SYMPTOMS
CONSTIPATION: 0
DYSURIA: 0
DYSPHORIC MOOD: 0
CHILLS: 0
SHORTNESS OF BREATH: 0
NUMBNESS: 0
FATIGUE: 0
DIARRHEA: 0
HEADACHES: 0
NAUSEA: 0
DIZZINESS: 0
FREQUENCY: 0
NERVOUS/ANXIOUS: 0
ABDOMINAL PAIN: 0
FEVER: 0
COUGH: 0
PALPITATIONS: 0
HEMATURIA: 0
WHEEZING: 0

## 2024-03-13 NOTE — PROGRESS NOTES
"Subjective   Patient ID: Linda Gomez is a 68 y.o. female who presents for Follow-up.    HPI   Still isn't sure why she was so dehydrated in December causing KAMERON. She did have bigeminy but hasn't needed metoprolol. She had median arcuate ligament syndrome on scan. No issue with median arcuate ligament syndrome as asymptomatic.     Reflux went away after 2 weeks on omeprazole.    Had colonoscopy 2 years ago with repeat in 5 years    Review of Systems   Constitutional:  Negative for chills, fatigue and fever.   Respiratory:  Negative for cough, shortness of breath and wheezing.    Cardiovascular:  Negative for chest pain, palpitations and leg swelling.   Gastrointestinal:  Negative for abdominal pain, constipation, diarrhea and nausea.   Genitourinary:  Negative for dysuria, frequency, hematuria and urgency.   Neurological:  Negative for dizziness, numbness and headaches.   Psychiatric/Behavioral:  Negative for dysphoric mood. The patient is not nervous/anxious.        Objective   /60 (BP Location: Right arm, Patient Position: Sitting, BP Cuff Size: Adult)   Pulse 66   Temp 36.2 °C (97.2 °F) (Temporal)   Ht 1.68 m (5' 6.14\")   Wt 68.4 kg (150 lb 12.8 oz)   SpO2 99%   BMI 24.24 kg/m²     Physical Exam  Constitutional:       Appearance: Normal appearance.   HENT:      Head: Normocephalic and atraumatic.   Eyes:      Extraocular Movements: Extraocular movements intact.      Pupils: Pupils are equal, round, and reactive to light.   Cardiovascular:      Rate and Rhythm: Normal rate and regular rhythm.      Heart sounds: Normal heart sounds. No murmur heard.  Pulmonary:      Effort: Pulmonary effort is normal.      Breath sounds: Normal breath sounds. No wheezing.   Abdominal:      General: Bowel sounds are normal.      Palpations: Abdomen is soft.      Tenderness: There is no abdominal tenderness. There is no guarding.   Musculoskeletal:         General: Normal range of motion.   Skin:     General: Skin is " warm and dry.   Neurological:      General: No focal deficit present.      Mental Status: She is alert and oriented to person, place, and time.   Psychiatric:         Mood and Affect: Mood normal.         Behavior: Behavior normal.         Assessment/Plan   Problem List Items Addressed This Visit       Ulcerative proctitis without complication (CMS/HCC)     Other Visit Diagnoses       Gastroesophageal reflux disease, unspecified whether esophagitis present    -  Primary    Median arcuate ligament syndrome (CMS/HCC)              Abdominal pain has subsided.  She is done with her reflux medications, will take prn       Patient understands and agrees with treatment plan    Linda Keating,    03/13/24

## 2024-03-18 ENCOUNTER — TELEPHONE (OUTPATIENT)
Dept: RHEUMATOLOGY | Facility: CLINIC | Age: 69
End: 2024-03-18

## 2024-03-18 ENCOUNTER — PATIENT OUTREACH (OUTPATIENT)
Dept: PRIMARY CARE | Facility: CLINIC | Age: 69
End: 2024-03-18
Payer: MEDICARE

## 2024-03-18 NOTE — TELEPHONE ENCOUNTER
Patient wants bloodwork orders for a total serum peptide. This was left on a VM. She also  requested orders for a bone density to be done in June. Please call her at [phone].

## 2024-03-19 DIAGNOSIS — M81.0 AGE RELATED OSTEOPOROSIS, UNSPECIFIED PATHOLOGICAL FRACTURE PRESENCE: ICD-10-CM

## 2024-03-19 DIAGNOSIS — M85.80 OSTEOPENIA, UNSPECIFIED LOCATION: Primary | ICD-10-CM

## 2024-03-19 NOTE — TELEPHONE ENCOUNTER
Updated pt that the Dexa and labs are already ordered for her. She is to complete dexa after 6/14/24 as she had her last done 6/14/22. If she has concerns/needs call our rheum office back at [phone].

## 2024-05-06 ENCOUNTER — LAB (OUTPATIENT)
Dept: LAB | Facility: LAB | Age: 69
End: 2024-05-06
Payer: MEDICARE

## 2024-05-06 DIAGNOSIS — M85.80 OSTEOPENIA, UNSPECIFIED LOCATION: ICD-10-CM

## 2024-05-06 PROCEDURE — 82306 VITAMIN D 25 HYDROXY: CPT

## 2024-05-06 PROCEDURE — 82523 COLLAGEN CROSSLINKS: CPT

## 2024-05-06 PROCEDURE — 36415 COLL VENOUS BLD VENIPUNCTURE: CPT

## 2024-05-07 LAB — 25(OH)D3 SERPL-MCNC: 46 NG/ML (ref 30–100)

## 2024-05-09 LAB — COLLAGEN NTX SER-SCNC: 24.4 NM BCE

## 2024-07-09 ENCOUNTER — APPOINTMENT (OUTPATIENT)
Dept: RADIOLOGY | Facility: CLINIC | Age: 69
End: 2024-07-09
Payer: MEDICARE

## 2024-07-12 ENCOUNTER — HOSPITAL ENCOUNTER (OUTPATIENT)
Dept: RADIOLOGY | Facility: CLINIC | Age: 69
Discharge: HOME | End: 2024-07-12
Payer: MEDICARE

## 2024-07-12 DIAGNOSIS — M85.80 OSTEOPENIA, UNSPECIFIED LOCATION: ICD-10-CM

## 2024-07-12 DIAGNOSIS — M81.0 AGE RELATED OSTEOPOROSIS, UNSPECIFIED PATHOLOGICAL FRACTURE PRESENCE: ICD-10-CM

## 2024-07-12 PROCEDURE — 77080 DXA BONE DENSITY AXIAL: CPT | Performed by: RADIOLOGY

## 2024-07-12 PROCEDURE — 77080 DXA BONE DENSITY AXIAL: CPT

## 2024-08-02 ENCOUNTER — OFFICE VISIT (OUTPATIENT)
Dept: RHEUMATOLOGY | Facility: CLINIC | Age: 69
End: 2024-08-02
Payer: MEDICARE

## 2024-08-02 VITALS
WEIGHT: 146 LBS | DIASTOLIC BLOOD PRESSURE: 74 MMHG | TEMPERATURE: 97.6 F | SYSTOLIC BLOOD PRESSURE: 126 MMHG | OXYGEN SATURATION: 96 % | BODY MASS INDEX: 23.46 KG/M2 | HEIGHT: 66 IN | HEART RATE: 71 BPM

## 2024-08-02 DIAGNOSIS — M85.80 OSTEOPENIA, UNSPECIFIED LOCATION: Primary | ICD-10-CM

## 2024-08-02 PROCEDURE — 1159F MED LIST DOCD IN RCRD: CPT | Performed by: INTERNAL MEDICINE

## 2024-08-02 PROCEDURE — 99213 OFFICE O/P EST LOW 20 MIN: CPT | Performed by: INTERNAL MEDICINE

## 2024-08-02 PROCEDURE — 3008F BODY MASS INDEX DOCD: CPT | Performed by: INTERNAL MEDICINE

## 2024-08-02 PROCEDURE — 1036F TOBACCO NON-USER: CPT | Performed by: INTERNAL MEDICINE

## 2024-08-02 NOTE — PROGRESS NOTES
She continues to be active with weightbearing activities.  She continues to be active she has not had any recent falls.  She continues to take calcium and vitamin D.    DEXA bone density T-score..  (7/12/2024)..  (6/14/2022)..  (7/19/2019)  Left total femur.............................. -2.0................... -1.6................... -1.3  Left femoral neck...........................-1.8.................... -2.0................... -2.1  L1-L4.................................................... 0.1...................... 0.1.................... 0.8    Laboratory (5/6/2024) TSH 2.48, 25-hydroxy vitamin D 46, serum N-telopeptide 24.4.    She has osteopenia, history of ulcerative colitis that has been asymptomatic, asthma, hyperlipidemia.  The bone mineral density shows slight worsening of the osteopenia in the left total femur and improvement in the bone mineral density of the left femoral neck of pharmacologic antiresorptive therapy.  She had previously taken alendronate from 5/20/2020 through 2/20/2022.  Prior to that she had taken Prolia 5/2/2019 through 5/20/2020.  There is slight increase in the serum N-telopeptide level off of pharmacologic antiresorptive therapy.    Continue to observe off of pharmacologic therapy with continued weightbearing exercise as well as calcium and vitamin D supplements.  She is to have a serum C telopeptide level done prior to the next office visit.  She is to return in 1 year for follow-up evaluation.

## 2024-08-28 ENCOUNTER — APPOINTMENT (OUTPATIENT)
Dept: DERMATOLOGY | Facility: CLINIC | Age: 69
End: 2024-08-28
Payer: MEDICARE

## 2024-08-28 DIAGNOSIS — Z12.83 SCREENING EXAM FOR SKIN CANCER: ICD-10-CM

## 2024-08-28 DIAGNOSIS — L81.4 LENTIGO: ICD-10-CM

## 2024-08-28 DIAGNOSIS — L82.1 SEBORRHEIC KERATOSIS: ICD-10-CM

## 2024-08-28 DIAGNOSIS — D22.9 MULTIPLE BENIGN NEVI: Primary | ICD-10-CM

## 2024-08-28 DIAGNOSIS — L72.0 EPIDERMAL INCLUSION CYST: ICD-10-CM

## 2024-08-28 DIAGNOSIS — D18.01 HEMANGIOMA OF SKIN: ICD-10-CM

## 2024-08-28 DIAGNOSIS — D48.5 NEOPLASM OF UNCERTAIN BEHAVIOR OF SKIN: ICD-10-CM

## 2024-08-28 DIAGNOSIS — L57.8 ACTINIC SKIN DAMAGE: ICD-10-CM

## 2024-08-28 PROCEDURE — 1159F MED LIST DOCD IN RCRD: CPT | Performed by: DERMATOLOGY

## 2024-08-28 PROCEDURE — 11302 SHAVE SKIN LESION 1.1-2.0 CM: CPT | Performed by: DERMATOLOGY

## 2024-08-28 PROCEDURE — 99213 OFFICE O/P EST LOW 20 MIN: CPT | Performed by: DERMATOLOGY

## 2024-08-28 PROCEDURE — 1036F TOBACCO NON-USER: CPT | Performed by: DERMATOLOGY

## 2024-08-28 PROCEDURE — 1160F RVW MEDS BY RX/DR IN RCRD: CPT | Performed by: DERMATOLOGY

## 2024-08-28 ASSESSMENT — DERMATOLOGY PATIENT ASSESSMENT
DO YOU HAVE ANY NEW OR CHANGING LESIONS: NO
DO YOU HAVE IRREGULAR MENSTRUAL CYCLES: NO
ARE YOU TRYING TO GET PREGNANT: NO
DO YOU USE A TANNING BED: NO
HAVE YOU HAD OR DO YOU HAVE A STAPH INFECTION: NO
DO YOU USE SUNSCREEN: OCCASIONALLY
ARE YOU ON BIRTH CONTROL: NO
ARE YOU AN ORGAN TRANSPLANT RECIPIENT: NO
HAVE YOU HAD OR DO YOU HAVE VASCULAR DISEASE: NO

## 2024-08-28 ASSESSMENT — DERMATOLOGY QUALITY OF LIFE (QOL) ASSESSMENT
RATE HOW BOTHERED YOU ARE BY EFFECTS OF YOUR SKIN PROBLEMS ON YOUR ACTIVITIES (EG, GOING OUT, ACCOMPLISHING WHAT YOU WANT, WORK ACTIVITIES OR YOUR RELATIONSHIPS WITH OTHERS): 0 - NEVER BOTHERED
RATE HOW EMOTIONALLY BOTHERED YOU ARE BY YOUR SKIN PROBLEM (FOR EXAMPLE, WORRY, EMBARRASSMENT, FRUSTRATION): 0 - NEVER BOTHERED
ARE THERE EXCLUSIONS OR EXCEPTIONS FOR THE QUALITY OF LIFE ASSESSMENT: NO
DATE THE QUALITY-OF-LIFE ASSESSMENT WAS COMPLETED: 67080
RATE HOW BOTHERED YOU ARE BY SYMPTOMS OF YOUR SKIN PROBLEM (EG, ITCHING, STINGING BURNING, HURTING OR SKIN IRRITATION): 0 - NEVER BOTHERED

## 2024-08-28 ASSESSMENT — ITCH NUMERIC RATING SCALE: HOW SEVERE IS YOUR ITCHING?: 0

## 2024-08-28 ASSESSMENT — PATIENT GLOBAL ASSESSMENT (PGA): PATIENT GLOBAL ASSESSMENT: PATIENT GLOBAL ASSESSMENT:  1 - CLEAR

## 2024-08-28 NOTE — PROGRESS NOTES
Subjective     Linda Gomez is a 69 y.o. female who presents for the following: Skin Check.     Last derm visit 6/2/21 for Full Skin Exam - she has two actinic keratoses on right dorsum of nose and right cheek that have been stable for years and she declines treatment    Since last visit she started using tecnu and no more poison ivy rashes    Review of Systems:  No other skin or systemic complaints other than what is documented elsewhere in the note.    The following portions of the chart were reviewed this encounter and updated as appropriate:  Tobacco  Allergies  Meds  Problems  Med Hx  Surg Hx         Skin Cancer History  No skin cancer on file.      Specialty Problems    None       Objective   Well appearing patient in no apparent distress; mood and affect are within normal limits.    A full examination was performed including scalp, head, eyes, ears, nose, lips, neck, chest, axillae, abdomen, back, buttocks, bilateral upper extremities, bilateral lower extremities, hands, feet, fingers, toes, fingernails, and toenails. All findings within normal limits unless otherwise noted below.    Assessment/Plan   1. Multiple benign nevi  Brown and tan macules and papules with reassuring findings on dermoscopy    -These lesions have benign, reassuring patterns on dermoscopy  -Recommend continued self observation, and to contact the office if any changes in nevi are noticed    2. Lentigo  Tan macules    -Benign appearing on exam  -Reassurance, recommend observation    3. Seborrheic keratosis  Stuck on, waxy macule(s)/papule(s)/plaque(s) with comedo-like openings and milia like cysts    -Discussed the nature of the diagnosis  -Reassurance, recommend continued observation    4. Hemangioma of skin  Cherry red papules    -Discussed the nature of the diagnosis  -Reassurance, recommend continued observation    5. Actinic skin damage  Background of photodamage with hyper- and hypo-pigmented macules on the skin    6.  Screening exam for skin cancer  As part of a routine Full Skin Exam, a genital examination with the presence of a chaperone was offered. The patient declined the exam and declined the chaperone.       Full body skin exam  -No lesions concerning for malignancy on the remainder the skin exam today   - The ugly duckling sign was discussed. Monitor for any skin lesions that are different in color, shape, or size than others on body  -Sun protection was discussed. Recommend SPF 30+, hats with brims, sun protective clothing, and avoiding sun exposure between 10 AM and 2 PM whenever possible  -Recommend regular skin exams or sooner if new or changing lesions       7. Neoplasm of uncertain behavior of skin  Right Inframammary Fold  1.5 cm crusted plaque          Shave removal    Lesion length (cm):  1  Lesion width (cm):  1.5  Margin per side (cm):  0.1  Lesion diameter (cm):  1.7  Informed consent: discussed and consent obtained    Timeout: patient name, date of birth, surgical site, and procedure verified    Procedure prep:  Patient was prepped and draped  Anesthesia: the lesion was anesthetized in a standard fashion    Anesthetic:  1% lidocaine w/ epinephrine 1-100,000 local infiltration  Instrument used: DermaBlade    Hemostasis achieved with: aluminum chloride    Outcome: patient tolerated procedure well    Post-procedure details: sterile dressing applied and wound care instructions given    Dressing type: bandage and petrolatum      Staff Communication: Dermatology Local Anesthesia: Lidocaine 0.5% with Epinephrine 1;200,000 - Amount: 3 ml    Specimen 1 - Dermatopathology- DERM LAB  Differential Diagnosis: inflamed seborrheic keratosis   Check Margins Yes/No?:    Comments:    Dermpath Lab: Routine Histopathology (formalin-fixed tissue)    8. Epidermal inclusion cyst (2)  Left Upper Back, Mid Back  Subcutaneous nodule(s) with normal-appearing overlying skin and connecting pore    Mid upper back - 1 cm  Left upper back  0.5 cm     Discussed removal options and risks of scar, declines at this time. Can reconsider for the future. Would send prior auth. Mid upper back would be with derm surg, left upper back could be with me    -Discussed nature of the condition  -Reassurance, recommend observation at this time        Follow up 1-2 years Full Skin Exam

## 2024-08-30 LAB
LABORATORY COMMENT REPORT: NORMAL
PATH REPORT.FINAL DX SPEC: NORMAL
PATH REPORT.GROSS SPEC: NORMAL
PATH REPORT.MICROSCOPIC SPEC OTHER STN: NORMAL
PATH REPORT.RELEVANT HX SPEC: NORMAL
PATH REPORT.TOTAL CANCER: NORMAL

## 2024-09-10 ENCOUNTER — APPOINTMENT (OUTPATIENT)
Dept: PRIMARY CARE | Facility: CLINIC | Age: 69
End: 2024-09-10
Payer: MEDICARE

## 2024-10-24 ENCOUNTER — TELEPHONE (OUTPATIENT)
Dept: PRIMARY CARE | Facility: CLINIC | Age: 69
End: 2024-10-24
Payer: MEDICARE

## 2024-10-25 ENCOUNTER — TELEPHONE (OUTPATIENT)
Dept: GASTROENTEROLOGY | Facility: CLINIC | Age: 69
End: 2024-10-25
Payer: MEDICARE

## 2024-10-25 NOTE — TELEPHONE ENCOUNTER
Patient was in [place] traveling has had a change in bowel movements and the bowel movement is a little loose without significant watery diarrhea or bleeding.  She may have a little early food poisoning have recommended Metamucil every day and a bland diet.  If no resolution or worsens we will do stool cultures and/or blood work.  She will call me next week.

## 2024-10-28 ENCOUNTER — APPOINTMENT (OUTPATIENT)
Dept: PRIMARY CARE | Facility: CLINIC | Age: 69
End: 2024-10-28
Payer: MEDICARE

## 2024-10-28 VITALS
DIASTOLIC BLOOD PRESSURE: 70 MMHG | HEIGHT: 67 IN | BODY MASS INDEX: 22.79 KG/M2 | WEIGHT: 145.2 LBS | SYSTOLIC BLOOD PRESSURE: 112 MMHG

## 2024-10-28 DIAGNOSIS — Z13.31 DEPRESSION SCREENING: ICD-10-CM

## 2024-10-28 DIAGNOSIS — E78.00 ELEVATED LDL CHOLESTEROL LEVEL: ICD-10-CM

## 2024-10-28 DIAGNOSIS — Z12.31 BREAST CANCER SCREENING BY MAMMOGRAM: ICD-10-CM

## 2024-10-28 DIAGNOSIS — Z00.00 MEDICARE ANNUAL WELLNESS VISIT, SUBSEQUENT: Primary | ICD-10-CM

## 2024-10-28 PROCEDURE — 3008F BODY MASS INDEX DOCD: CPT | Performed by: STUDENT IN AN ORGANIZED HEALTH CARE EDUCATION/TRAINING PROGRAM

## 2024-10-28 PROCEDURE — 1036F TOBACCO NON-USER: CPT | Performed by: STUDENT IN AN ORGANIZED HEALTH CARE EDUCATION/TRAINING PROGRAM

## 2024-10-28 PROCEDURE — G0444 DEPRESSION SCREEN ANNUAL: HCPCS | Performed by: STUDENT IN AN ORGANIZED HEALTH CARE EDUCATION/TRAINING PROGRAM

## 2024-10-28 PROCEDURE — 1159F MED LIST DOCD IN RCRD: CPT | Performed by: STUDENT IN AN ORGANIZED HEALTH CARE EDUCATION/TRAINING PROGRAM

## 2024-10-28 PROCEDURE — G0439 PPPS, SUBSEQ VISIT: HCPCS | Performed by: STUDENT IN AN ORGANIZED HEALTH CARE EDUCATION/TRAINING PROGRAM

## 2024-10-28 PROCEDURE — 1160F RVW MEDS BY RX/DR IN RCRD: CPT | Performed by: STUDENT IN AN ORGANIZED HEALTH CARE EDUCATION/TRAINING PROGRAM

## 2024-10-28 PROCEDURE — 1158F ADVNC CARE PLAN TLK DOCD: CPT | Performed by: STUDENT IN AN ORGANIZED HEALTH CARE EDUCATION/TRAINING PROGRAM

## 2024-10-28 PROCEDURE — 1170F FXNL STATUS ASSESSED: CPT | Performed by: STUDENT IN AN ORGANIZED HEALTH CARE EDUCATION/TRAINING PROGRAM

## 2024-10-28 PROCEDURE — 1123F ACP DISCUSS/DSCN MKR DOCD: CPT | Performed by: STUDENT IN AN ORGANIZED HEALTH CARE EDUCATION/TRAINING PROGRAM

## 2024-10-28 ASSESSMENT — ENCOUNTER SYMPTOMS
WHEEZING: 0
PALPITATIONS: 0
CONSTIPATION: 0
NUMBNESS: 0
CHILLS: 0
DIARRHEA: 0
ABDOMINAL PAIN: 0
FATIGUE: 0
HEADACHES: 0
FREQUENCY: 0
COUGH: 0
HEMATURIA: 0
FEVER: 0
SHORTNESS OF BREATH: 0
DYSURIA: 0
NAUSEA: 0
DYSPHORIC MOOD: 0
NERVOUS/ANXIOUS: 0
DIZZINESS: 0

## 2024-10-28 ASSESSMENT — ACTIVITIES OF DAILY LIVING (ADL)
BATHING: INDEPENDENT
MANAGING_FINANCES: INDEPENDENT
TAKING_MEDICATION: INDEPENDENT
DRESSING: INDEPENDENT
GROCERY_SHOPPING: INDEPENDENT
DOING_HOUSEWORK: INDEPENDENT

## 2024-10-30 ENCOUNTER — TELEPHONE (OUTPATIENT)
Dept: GASTROENTEROLOGY | Facility: CLINIC | Age: 69
End: 2024-10-30
Payer: MEDICARE

## 2024-11-11 NOTE — PROGRESS NOTES
Patient: Linda Gomez    [id number]  : 1955 -- AGE 69 y.o.    Provider: Dinh Pisano MD     Location  [facility]   Service Date: 2024          [facility]  Follow-Up Visit Note    History of Present Illness   Background:  Linda Gomez is a 69 y.o. female presenting for follow-up of cough-variant asthma.    Today's (2024) HPI:   Patient presents for reestablishment of care for cough variant asthma.  Reports history of significant cough paroxysms that eventually were diagnosed as cough variant asthma.  Has been doing well on inhaled corticosteroid for over a year.  Overall doing very well, however today's wondering about possible reduction or even trial cessation of medication.    Review of Systems:  Review of Systems   Constitutional:  Negative for chills, fever and unexpected weight change.   HENT:  Negative for congestion.    Respiratory:  Negative for cough, shortness of breath and wheezing.    Cardiovascular:  Negative for chest pain and leg swelling.   Psychiatric/Behavioral:  Negative for sleep disturbance.    All other systems reviewed and are negative.      Past Medical History   She has a past medical history of Personal history of other diseases of the musculoskeletal system and connective tissue.    Immunizations     Immunization History   Administered Date(s) Administered    Flu vaccine (IIV4), preservative free *Check age/dose* 10/21/2016, 2017    Flu vaccine, quadrivalent, high-dose, preservative free, age 65y+ (FLUZONE) 10/13/2020, 10/18/2021, 10/17/2023    Flu vaccine, trivalent, preservative free, HIGH-DOSE, age 65y+ (Fluzone) 2018, 2024    Flu vaccine, trivalent, preservative free, age 6 months and greater (Fluarix/Fluzone/Flulaval) 2015    Hepatitis A vaccine, age 19 years and greater (HAVRIX) 2018    Influenza, Seasonal, Quadrivalent, Adjuvanted 10/03/2022    Influenza, seasonal, injectable 2016     MMR vaccine, subcutaneous (MMR II) 09/13/2007, 10/30/2007    Moderna COVID-19 vaccine, 12 years and older (50mcg/0.5mL)(Spikevax) 10/17/2023, 03/26/2024    Moderna COVID-19 vaccine, bivalent, blue cap/gray label *Check age/dose* 10/03/2022, 06/17/2023    Novel influenza-H1N1-09, preservative-free 11/25/2009    Pfizer COVID-19 vaccine, 12 years and older, (30mcg/0.3mL) (Comirnaty) 09/23/2024    Pneumococcal conjugate vaccine, 13-valent (PREVNAR 13) 12/14/2018    Pneumococcal polysaccharide vaccine, 23-valent, age 2 years and older (PNEUMOVAX 23) 03/11/2020    RSV, 60 Years And Older (AREXVY) 11/14/2023    Td vaccine, age 7 years and older (TDVAX) 01/30/2018    Tdap vaccine, age 7 year and older (BOOSTRIX, ADACEL) 09/04/2007, 01/01/2018    Zoster vaccine, recombinant, adult (SHINGRIX) 07/29/2019    Zoster, live 11/01/2014       Medications and Allergies   Medications:  Current Outpatient Medications   Medication Instructions    albuterol 90 mcg/actuation inhaler 2 puffs, Every 4 hours PRN    calcium carbonate (Oscal) 500 mg calcium (1,250 mg) tablet 2 tablets, Daily    cetirizine (ZYRTEC) 10 mg, As needed    fluticasone (Flonase) 50 mcg/actuation nasal spray 1 spray, Daily    fluticasone furoate (Arnuity Ellipta) 100 mcg/actuation inhaler 1 puff, inhalation, Daily, Rinse mouth with water after use to reduce aftertaste and incidence of candidiasis. Do not swallow.    fluticasone furoate (Arnuity Ellipta) 50 mcg/actuation inhaler 1 puff, inhalation, Daily, Rinse mouth with water after use to reduce aftertaste and incidence of candidiasis. Do not swallow.    metoprolol tartrate (LOPRESSOR) 25 mg, oral, 2 times daily PRN        Allergies:  Amoxicillin-pot clavulanate, Cat dander, Dog hair standardized allergenic extract, Pollen extracts, Ragweed, Sulfasalazine, and Tree and shrub pollen    Social History   She reports that she has never smoked. She has never used smokeless tobacco. She reports current alcohol use of  "about 3.0 standard drinks of alcohol per week. She reports that she does not use drugs.    Smoking History: Never smoker  Exposure/Job History: Unremarkable    Family History     Family History   Problem Relation Name Age of Onset    Other (esophageal ulcer) Mother      Osteoporosis Mother      Transient ischemic attack Mother      Diabetes Father      Drug abuse Father      Hypertension Father      Breast cancer Sister      Cataracts Sister      Diabetes Sister      Hypothyroidism Sister      Lung cancer Sister         Physical Exam   /74   Pulse 72   Temp 35.7 °C (96.3 °F)   Ht 1.702 m (5' 7\")   Wt 64.9 kg (143 lb)   SpO2 98%   BMI 22.40 kg/m²     General: ambulated independently; no acute distress; well-nourished; work of breathing was not increased; normal vocal character  HEENT: normocephalic; anicteric sclerae; conjunctivae not injected; nasal mucosa was unremarkable; oropharynx was clear without evidence of thrush; dentition was good.  Neck: supple; no lymphadenopathy or thyromegaly.  Chest: clear to auscultation bilaterally; no chest wall deformity.  Cardiac: regular rhythm; no gallop or murmur.  Abdomen: soft; non-tender; non-distended; no hepatosplenomegaly.  Extremities: no leg edema; no digital clubbing; 2+ pulses  Psychiatric: did not appear depressed or anxious.    Pulmonary Function Test Results     PFTs (1/11/2021) - Normal spirometry, lung volumes, DLCO.    Chest Imaging     CTA CAP (12/2023)  IMPRESSION:  1. No aortic aneurysm or acute dissection.  2. Focal narrowing at the proximal celiac artery with poststenotic  dilation. Please correlate with clinical concern for median arcuate  ligament syndrome.  3. Small hiatal hernia.  4. Colonic diverticulosis.    Echocardiogram     TTE (1/2024)  CONCLUSIONS:   1. Left ventricular systolic function is normal with a 55-60% estimated ejection fraction.   2. RVSP within normal limits.    Labs/Cultures     Lab Results   Component Value Date    WBC " "7.3 12/19/2023    HGB 12.9 12/19/2023    HCT 40.3 12/19/2023    MCV 99 12/19/2023     12/19/2023       Lab Results   Component Value Date    CREATININE 0.82 12/19/2023    BUN 12 12/19/2023     12/19/2023    K 4.4 12/19/2023     12/19/2023    CO2 28 12/19/2023        No results found for: \"MO\", \"RF\", \"SEDRATE\"     IgE: No results found for: \"IGE\"     AEC:   Eosinophils Absolute (x10*3/uL)   Date Value   12/18/2023 0.17     Eosinophils % (%)   Date Value   12/18/2023 2.0       Assessment and Plan     Linda Gomez is a 69 y.o. female presenting for follow-up of cough-variant asthma.    Problem List Items Addressed This Visit       Cough variant asthma ([facility]) - Primary    Current Assessment & Plan     Extended discussion with patient regarding workup to-date, mechanisms of asthma and rationale for treatment. Given her excellent control, reasonable to step-down therapy. Will trial low-dose ICS and follow-up clinically and with FeNO. If doing well, could consider trial off of maintenance therapy. Also discussed considering SMART with Symbicort.         Relevant Medications    fluticasone furoate (Arnuity Ellipta) 50 mcg/actuation inhaler    Other Relevant Orders    Follow Up In Pulmonology    Exhaled Nitric Oxide (FeNO)      Follow-up: 6 months     Dinh Pisano MD   of Medicine, [employer]  [facility] Division of Pulmonary, Critical Care and Sleep Medicine  4:14 PM  11/14/24  "

## 2024-11-14 ENCOUNTER — OFFICE VISIT (OUTPATIENT)
Dept: PULMONOLOGY | Facility: CLINIC | Age: 69
End: 2024-11-14
Payer: MEDICARE

## 2024-11-14 VITALS
DIASTOLIC BLOOD PRESSURE: 74 MMHG | SYSTOLIC BLOOD PRESSURE: 129 MMHG | OXYGEN SATURATION: 98 % | BODY MASS INDEX: 22.44 KG/M2 | HEIGHT: 67 IN | TEMPERATURE: 96.3 F | HEART RATE: 72 BPM | WEIGHT: 143 LBS

## 2024-11-14 DIAGNOSIS — J45.991 COUGH VARIANT ASTHMA (HHS-HCC): Primary | ICD-10-CM

## 2024-11-14 PROCEDURE — 1036F TOBACCO NON-USER: CPT | Performed by: HOSPITALIST

## 2024-11-14 PROCEDURE — 1159F MED LIST DOCD IN RCRD: CPT | Performed by: HOSPITALIST

## 2024-11-14 PROCEDURE — 3008F BODY MASS INDEX DOCD: CPT | Performed by: HOSPITALIST

## 2024-11-14 PROCEDURE — 1123F ACP DISCUSS/DSCN MKR DOCD: CPT | Performed by: HOSPITALIST

## 2024-11-14 PROCEDURE — 99215 OFFICE O/P EST HI 40 MIN: CPT | Performed by: HOSPITALIST

## 2024-11-14 PROCEDURE — 99417 PROLNG OP E/M EACH 15 MIN: CPT | Performed by: HOSPITALIST

## 2024-11-14 PROCEDURE — 1126F AMNT PAIN NOTED NONE PRSNT: CPT | Performed by: HOSPITALIST

## 2024-11-14 RX ORDER — FLUTICASONE FUROATE 50 UG/1
1 POWDER RESPIRATORY (INHALATION) DAILY
Qty: 1 EACH | Refills: 11 | Status: SHIPPED | OUTPATIENT
Start: 2024-11-14

## 2024-11-14 ASSESSMENT — ENCOUNTER SYMPTOMS
COUGH: 0
CHILLS: 0
UNEXPECTED WEIGHT CHANGE: 0
FEVER: 0
WHEEZING: 0
SLEEP DISTURBANCE: 0
SHORTNESS OF BREATH: 0

## 2024-11-14 ASSESSMENT — ASTHMA QUESTIONNAIRES
ACT_TOTALSCORE: 25
QUESTION_1 LAST FOUR WEEKS HOW MUCH OF THE TIME DID YOUR ASTHMA KEEP YOU FROM GETTING AS MUCH DONE AT WORK, SCHOOL OR AT HOME: NONE OF THE TIME
QUESTION_2 LAST FOUR WEEKS HOW OFTEN HAVE YOU HAD SHORTNESS OF BREATH: NOT AT ALL
QUESTION_5 LAST FOUR WEEKS HOW WOULD YOU RATE YOUR ASTHMA CONTROL: COMPLETELY CONTROLLED
QUESTION_3 LAST FOUR WEEKS HOW OFTEN DID YOUR ASTHMA SYMPTOMS (WHEEZING, COUGHING, SHORTNESS OF BREATH, CHEST TIGHTNESS OR PAIN) WAKE YOU UP AT NIGHT OR EARLIER THAN USUAL IN THE MORNING: NOT AT ALL
QUESTION_4 LAST FOUR WEEKS HOW OFTEN HAVE YOU USED YOUR RESCUE INHALER OR NEBULIZER MEDICATION (SUCH AS ALBUTEROL): NOT AT ALL

## 2024-11-14 ASSESSMENT — PAIN SCALES - GENERAL: PAINLEVEL_OUTOF10: 0-NO PAIN

## 2024-11-14 NOTE — ASSESSMENT & PLAN NOTE
Extended discussion with patient regarding workup to-date, mechanisms of asthma and rationale for treatment. Given her excellent control, reasonable to step-down therapy. Will trial low-dose ICS and follow-up clinically and with FeNO. If doing well, could consider trial off of maintenance therapy. Also discussed considering SMART with Symbicort.

## 2024-11-14 NOTE — PATIENT INSTRUCTIONS
[facility] Pulmonary Medicine  [facility]   [facility]  [address]   [place] [address]       NAME: Linda Gomez   DATE: 2024     Your Pulmonary Physician Today: Dinh Pisano MD  Your Primary Care Provider: Linda Keating DO     DIAGNOSIS:  1. Cough variant asthma ([facility]-HCC)  fluticasone furoate (Arnuity Ellipta) 50 mcg/actuation inhaler    Follow Up In Pulmonology    Exhaled Nitric Oxide (FeNO)          Thank you for coming to the Pulmonary Medicine Clinic today! Below is a summary of your treatment plan, other important information, and our contact numbers:    TREATMENT PLAN     We discussed the followin.) When ready, start the lower dose (50 mcg) Arnuity inhaler.  2.) We will check a breathing test (FeNO) and see you in clinic ~ 2 months into the lower doser to make sure things are going OK.  3.) If breathing/cough worsens in the meantime, call our office and let us know.    Tests that need to be scheduled:  1.) Breathing test (FeNO)    Medications/inhalers prescribed:  1.) Arnuity (lower dose) inhaler    Follow-up Appointment: 6 months    IMPORTANT INFORMATION     Call 911 for medical emergencies.  Our offices are generally open from Monday-Friday, 9 am - 5 pm.  If you need to get in touch with me, you may either call me and my team(number is below) or you can use [employer].    IMPORTANT PHONE NUMBERS     Pulmonary Department Main Line: [phone]   Pulmonary Nurse (Rita Silva RN): [phone]    For scheduling purposes:  Breathing (pulmonary function) or a walking test: [phone]   EKG's, Echocardiograms and Cardiopulmonary Stress Tests: [phone]   Radiology tests such as Nuclear Medicine, CT Scans, and MRI's: [phone]  Pulmonary clinic follow-up: [phone]      OUR ADULT PULMONARY MEDICINE TEAM   Please do not hesitate to call the office or sleep nurse with any questions between appointments:    Adult Pulmonary Nurse (Rita Silva,  "RN):  For questions about your diagnosis, care plan and refilling prescriptions: [phone]    Adult Pulmonary Medicine  (Alejandra Ashraf):  Please contact for scheduling issues: P: [phone]  F: [phone]    CONTACTING YOUR PULMONARY PHYSICIAN     Send a message directly to your physician through \"My Chart\", which is the email service through your  Records Account: [email or web address]   Call [phone] and leave a message. One of the administrative assistants will forward the message to your sleep medicine provider through \"My Chart\" and/or email.     PRESCRIPTIONS     We require 7 days advanced notice for prescription refills. If we do not receive the request in this time, we cannot guarantee that your medication will be refilled in time.    Dinh Pisano MD   of Medicine, [facility] [employer]  [facility] Division of Pulmonary, Critical Care and Sleep Medicine  "

## 2024-11-18 ENCOUNTER — APPOINTMENT (OUTPATIENT)
Dept: PULMONOLOGY | Facility: CLINIC | Age: 69
End: 2024-11-18
Payer: MEDICARE

## 2025-01-08 ENCOUNTER — HOSPITAL ENCOUNTER (OUTPATIENT)
Dept: RADIOLOGY | Facility: CLINIC | Age: 70
Discharge: HOME | End: 2025-01-08
Payer: MEDICARE

## 2025-01-08 DIAGNOSIS — Z12.31 BREAST CANCER SCREENING BY MAMMOGRAM: ICD-10-CM

## 2025-01-08 PROCEDURE — 77067 SCR MAMMO BI INCL CAD: CPT | Performed by: RADIOLOGY

## 2025-01-08 PROCEDURE — 77063 BREAST TOMOSYNTHESIS BI: CPT | Performed by: RADIOLOGY

## 2025-01-08 PROCEDURE — 77067 SCR MAMMO BI INCL CAD: CPT

## 2025-01-28 ENCOUNTER — TELEPHONE (OUTPATIENT)
Dept: PULMONOLOGY | Facility: HOSPITAL | Age: 70
End: 2025-01-28
Payer: MEDICARE

## 2025-01-28 DIAGNOSIS — J45.991 COUGH VARIANT ASTHMA (HHS-HCC): ICD-10-CM

## 2025-01-28 NOTE — TELEPHONE ENCOUNTER
Returned pt's call regarding the need to schedule a FUV and FeNo test. Pt was scheduled for FUV on 3/13/25 with Dr. Pisano. Pt confirmed date, time, and location. Pt was provided contact number to schedule her FeNo test. She verbalized understanding. Coral Isaac called from scheduling stating that she was unable to schedule the FeNo test due to the order having a future start date. New order was placed and Coral was notified.

## 2025-02-14 ENCOUNTER — OFFICE VISIT (OUTPATIENT)
Dept: URGENT CARE | Age: 70
End: 2025-02-14
Payer: MEDICARE

## 2025-02-14 VITALS
RESPIRATION RATE: 18 BRPM | HEART RATE: 83 BPM | BODY MASS INDEX: 23.07 KG/M2 | OXYGEN SATURATION: 98 % | HEIGHT: 67 IN | SYSTOLIC BLOOD PRESSURE: 141 MMHG | DIASTOLIC BLOOD PRESSURE: 75 MMHG | WEIGHT: 147 LBS | TEMPERATURE: 97.3 F

## 2025-02-14 DIAGNOSIS — H60.01 ABSCESS OF RIGHT EXTERNAL EAR: Primary | ICD-10-CM

## 2025-02-14 PROCEDURE — 1160F RVW MEDS BY RX/DR IN RCRD: CPT | Performed by: PHYSICIAN ASSISTANT

## 2025-02-14 PROCEDURE — 1123F ACP DISCUSS/DSCN MKR DOCD: CPT | Performed by: PHYSICIAN ASSISTANT

## 2025-02-14 PROCEDURE — 1159F MED LIST DOCD IN RCRD: CPT | Performed by: PHYSICIAN ASSISTANT

## 2025-02-14 PROCEDURE — 99203 OFFICE O/P NEW LOW 30 MIN: CPT | Performed by: PHYSICIAN ASSISTANT

## 2025-02-14 PROCEDURE — 1036F TOBACCO NON-USER: CPT | Performed by: PHYSICIAN ASSISTANT

## 2025-02-14 PROCEDURE — 3008F BODY MASS INDEX DOCD: CPT | Performed by: PHYSICIAN ASSISTANT

## 2025-02-14 RX ORDER — DOXYCYCLINE 100 MG/1
100 CAPSULE ORAL 2 TIMES DAILY
Qty: 14 CAPSULE | Refills: 0 | Status: SHIPPED | OUTPATIENT
Start: 2025-02-14 | End: 2025-02-21

## 2025-02-14 ASSESSMENT — ENCOUNTER SYMPTOMS
COLOR CHANGE: 1
DIAPHORESIS: 0
FEVER: 0
SHORTNESS OF BREATH: 0
CHILLS: 0

## 2025-02-14 NOTE — PROGRESS NOTES
"Subjective   Patient ID: Linda Gomez is a 69 y.o. female. They present today with a chief complaint of skin infection (X 3 days).    History of Present Illness  Abscess on external right ear x 3 - 4 days.    Denies fever, chills, sweats, chest pain, SIB          Past Medical History  Allergies as of 02/14/2025 - Reviewed 02/14/2025   Allergen Reaction Noted    Amoxicillin-pot clavulanate Unknown 12/04/2019    Cat dander Other 09/04/2007    Dog hair standardized allergenic extract Other 09/04/2007    Pollen extracts Other 09/04/2007    Ragweed Other 09/04/2007    Sulfasalazine Other 09/14/2023    Tree and shrub pollen Wheezing 08/02/2024       (Not in a hospital admission)       Past Medical History:   Diagnosis Date    Personal history of other diseases of the musculoskeletal system and connective tissue     History of osteoarthritis       Past Surgical History:   Procedure Laterality Date    ANKLE SURGERY  01/13/2015    Ankle Surgery    KNEE ARTHROSCOPY W/ DEBRIDEMENT  01/30/2014    Arthroscopy Knee Right    OTHER SURGICAL HISTORY  01/30/2014    Reported Hx Of Hip Replacement - Right Side    OTHER SURGICAL HISTORY  01/30/2014    Treatment Of Knee Fracture Patellar, Open    ROTATOR CUFF REPAIR  01/30/2014    Rotator Cuff Repair        reports that she has never smoked. She has never used smokeless tobacco. She reports current alcohol use of about 3.0 standard drinks of alcohol per week. She reports that she does not use drugs.    Review of Systems  Review of Systems   Constitutional:  Negative for chills, diaphoresis and fever.   Respiratory:  Negative for shortness of breath.    Cardiovascular:  Negative for chest pain.   Skin:  Positive for color change.   All other systems reviewed and are negative.                                 Objective    Vitals:    02/14/25 1456   BP: 141/75   Pulse: 83   Resp: 18   Temp: 36.3 °C (97.3 °F)   SpO2: 98%   Weight: 66.7 kg (147 lb)   Height: 1.702 m (5' 7\")     No LMP " recorded. Patient is postmenopausal.    Physical Exam  Constitutional:       General: She is not in acute distress.  Cardiovascular:      Rate and Rhythm: Normal rate.   Pulmonary:      Effort: Pulmonary effort is normal.   Skin:     Comments: <0.5cm non fluctuant abscess right external ear helix.    Neurological:      Mental Status: She is alert.         Procedures    Point of Care Test & Imaging Results from this visit  No results found for this visit on 02/14/25.   No results found.    Diagnostic study results (if any) were reviewed by [facility].    Assessment/Plan   Allergies, medications, history, and pertinent labs/EKGs/Imaging reviewed by Jenna Wood PA-C.     Orders and Diagnoses  There are no diagnoses linked to this encounter.    Medical Admin Record      Patient disposition: Home    Electronically signed by [facility]  3:06 PM

## 2025-03-07 LAB
ANION GAP SERPL CALCULATED.4IONS-SCNC: 12 MMOL/L (CALC) (ref 7–17)
BUN SERPL-MCNC: 16 MG/DL (ref 7–25)
BUN/CREAT SERPL: ABNORMAL (CALC) (ref 6–22)
CALCIUM SERPL-MCNC: 10 MG/DL (ref 8.6–10.4)
CHLORIDE SERPL-SCNC: 101 MMOL/L (ref 98–110)
CHOLEST SERPL-MCNC: 239 MG/DL
CHOLEST/HDLC SERPL: 2.9 (CALC)
CO2 SERPL-SCNC: 28 MMOL/L (ref 20–32)
CREAT SERPL-MCNC: 0.72 MG/DL (ref 0.6–1)
EGFRCR SERPLBLD CKD-EPI 2021: 89 ML/MIN/1.73M2
ERYTHROCYTE [DISTWIDTH] IN BLOOD BY AUTOMATED COUNT: 12.6 % (ref 11–15)
GLUCOSE SERPL-MCNC: 101 MG/DL (ref 65–99)
HCT VFR BLD AUTO: 48.8 % (ref 35–45)
HDLC SERPL-MCNC: 82 MG/DL
HGB BLD-MCNC: 15.3 G/DL (ref 11.7–15.5)
LDLC SERPL CALC-MCNC: 137 MG/DL (CALC)
MCH RBC QN AUTO: 31.5 PG (ref 27–33)
MCHC RBC AUTO-ENTMCNC: 31.4 G/DL (ref 32–36)
MCV RBC AUTO: 100.6 FL (ref 80–100)
NONHDLC SERPL-MCNC: 157 MG/DL (CALC)
PLATELET # BLD AUTO: 320 THOUSAND/UL (ref 140–400)
PMV BLD REES-ECKER: 11.6 FL (ref 7.5–12.5)
POTASSIUM SERPL-SCNC: 4.4 MMOL/L (ref 3.5–5.3)
RBC # BLD AUTO: 4.85 MILLION/UL (ref 3.8–5.1)
SODIUM SERPL-SCNC: 141 MMOL/L (ref 135–146)
TRIGL SERPL-MCNC: 95 MG/DL
WBC # BLD AUTO: 8.9 THOUSAND/UL (ref 3.8–10.8)

## 2025-03-12 LAB — COLLAGEN CTX SERPL-MCNC: 456 PG/ML

## 2025-03-13 ENCOUNTER — HOSPITAL ENCOUNTER (OUTPATIENT)
Dept: RESPIRATORY THERAPY | Facility: HOSPITAL | Age: 70
Discharge: HOME | End: 2025-03-13
Payer: MEDICARE

## 2025-03-13 ENCOUNTER — OFFICE VISIT (OUTPATIENT)
Dept: PULMONOLOGY | Facility: CLINIC | Age: 70
End: 2025-03-13
Payer: MEDICARE

## 2025-03-13 VITALS
HEART RATE: 69 BPM | DIASTOLIC BLOOD PRESSURE: 75 MMHG | SYSTOLIC BLOOD PRESSURE: 123 MMHG | BODY MASS INDEX: 22.29 KG/M2 | HEIGHT: 67 IN | WEIGHT: 142 LBS | TEMPERATURE: 97.6 F

## 2025-03-13 DIAGNOSIS — J45.991 COUGH VARIANT ASTHMA (HHS-HCC): Primary | ICD-10-CM

## 2025-03-13 DIAGNOSIS — J45.991 COUGH VARIANT ASTHMA (HHS-HCC): ICD-10-CM

## 2025-03-13 PROCEDURE — 99214 OFFICE O/P EST MOD 30 MIN: CPT | Performed by: HOSPITALIST

## 2025-03-13 PROCEDURE — 95012 NITRIC OXIDE EXP GAS DETER: CPT

## 2025-03-13 PROCEDURE — 1123F ACP DISCUSS/DSCN MKR DOCD: CPT | Performed by: HOSPITALIST

## 2025-03-13 PROCEDURE — 1159F MED LIST DOCD IN RCRD: CPT | Performed by: HOSPITALIST

## 2025-03-13 PROCEDURE — 1126F AMNT PAIN NOTED NONE PRSNT: CPT | Performed by: HOSPITALIST

## 2025-03-13 PROCEDURE — G2211 COMPLEX E/M VISIT ADD ON: HCPCS | Performed by: HOSPITALIST

## 2025-03-13 PROCEDURE — 3008F BODY MASS INDEX DOCD: CPT | Performed by: HOSPITALIST

## 2025-03-13 RX ORDER — BUDESONIDE AND FORMOTEROL FUMARATE DIHYDRATE 80; 4.5 UG/1; UG/1
2 AEROSOL RESPIRATORY (INHALATION)
Qty: 10.2 G | Refills: 5 | Status: SHIPPED | OUTPATIENT
Start: 2025-03-13 | End: 2025-09-09

## 2025-03-13 ASSESSMENT — PAIN SCALES - GENERAL: PAINLEVEL_OUTOF10: 0-NO PAIN

## 2025-03-13 NOTE — PATIENT INSTRUCTIONS
[facility] Pulmonary Medicine  [facility]   [facility]  [address]   [place] [address]     NAME: Linda Gomez   DATE: 3/13/2025     Your Pulmonary Physician Today: Dinh Pisano MD  Your Primary Care Provider: Linda Keating DO     DIAGNOSIS:  1. Cough variant asthma ([facility]-HCC)  budesonide-formoteroL (Symbicort) 80-4.5 mcg/actuation inhaler    Follow Up In Pulmonology    Respiratory Allergy Profile IgE    Respiratory Allergy Profile IgE        Thank you for coming to the Pulmonary Medicine Clinic today! Below is a summary of your treatment plan, other important information, and our contact numbers:    TREATMENT PLAN     We discussed the followin.) We can try to see if a new inhaler (Symbicort) to see if that works better.  2.) We will get a blood test to look at your allergy levels to see if shots would be worthwhile.    Tests that need to be scheduled:  1.) Respiratory Allergy IgE Profile (blood test)    Medications/inhalers prescribed:  1.) Symbicort (new daily inhaler)    Follow-up Appointment: 6 months    IMPORTANT INFORMATION     Call 911 for medical emergencies.  Our offices are generally open from Monday-Friday, 9 am - 5 pm.  If you need to get in touch with me, you may either call me and my team(number is below) or you can use [employer].    IMPORTANT PHONE NUMBERS (FOR SCHEDULING/QUESTIONS)     Pulmonary Department Main Line: [phone]   Pulmonary Nurse (Rita Silva RN): [phone]    For scheduling purposes:  Breathing (pulmonary function) or a walking test: [phone]   EKG's, Echocardiograms and Cardiopulmonary Stress Tests: [phone]   Radiology tests such as Nuclear Medicine, CT Scans, and MRI's: [phone]  Pulmonary clinic follow-up: [phone]      For sleep studies, Our team will contact you, however, you can also contact us as follows:  Main Phone Line (scheduling only): [phone]), option 3  Adult and Pediatric  "Locations  [facility] (6 years and older): [facility] - 4th floor ([address], [facility]) After hours line: [phone]  [facility] (Main campus: All ages): [facility], 6th floor. After hours line: [phone]   [place] (5 years and older; younger considered on case-by-case basis): [address]; [address], [address]. Scheduling  After hours line: [phone]   [place] (6 years and older): [address]; [address]; [address]   [place] (6 years and older): [address], [address]  After hours line: [phone]   [place] (13 years and older) in [place]: [address], 2nd floor  After hours line: [phone]   [place] (13 year and older): [address]  After hours line: [phone]     Adult Only Locations:   [place] (18 years and older): [address] [facility], 2nd floor   [place] (18 years and older): [address] [facility]; 4th floor  After hours line: [phone]   [place] (18 years and older) at [place]: [address] [place] [address]  After hours line: [phone]     OUR ADULT PULMONARY MEDICINE TEAM   Please do not hesitate to call the office or sleep nurse with any questions between appointments:    Adult Pulmonary Nurse (Rita Silva RN):  For questions about your diagnosis, care plan and refilling prescriptions: [phone]    Adult Pulmonary Medicine  (Alejandra Ashraf):  Please contact for scheduling issues: P: [phone]  F: [phone]    CONTACTING YOUR PULMONARY PHYSICIAN     Send a message directly to your physician through \"My Chart\", which is the email service through your  Records Account: [email or web address]   Call [phone] and leave a message. One of the administrative assistants will forward the message to your physician through \"My Chart\" and/or email.     PRESCRIPTIONS     We require 7 days " advanced notice for prescription refills. If we do not receive the request in this time, we cannot guarantee that your medication will be refilled in time.    Dinh Pisano MD   of Medicine, [facility] [employer]  [facility] Division of Pulmonary, Critical Care and Sleep Medicine

## 2025-03-14 ASSESSMENT — ENCOUNTER SYMPTOMS
WHEEZING: 0
CHILLS: 0
FEVER: 0
COUGH: 0
SHORTNESS OF BREATH: 0
SLEEP DISTURBANCE: 0
UNEXPECTED WEIGHT CHANGE: 0

## 2025-03-14 NOTE — ASSESSMENT & PLAN NOTE
Will change ICS to ICS/LABA to see if this allows more flexibility for her to control symptoms.  Will also perform respiratory allergy IgE profile, consider allergy/immunology referral if multiple positive results to consider desensitization.

## 2025-03-14 NOTE — PROGRESS NOTES
Patient: Linda Gomez    [id number]  : 1955 -- AGE 69 y.o.    Provider: Dinh Pisano MD     [facility]  [facility]   Service Date: 3/14/2025          [facility] Pulmonary Clinic  Follow-Up Visit Note    History of Present Illness   Background:  Linda Gomez is a 69 y.o. female presenting for follow-up of asthma.    Today's (3/14/2025) HPI:   Last visit, patient reports doing well overall.  Has been tolerating the low-dose ICS alone without any issues.  She did undergo FeNO test which showed intermediate result at 46 PPP.  Otherwise, denies significant ongoing cough, wheezing, fever/chills, chest pain.    Review of Systems:  Review of Systems   Constitutional:  Negative for chills, fever and unexpected weight change.   HENT:  Negative for congestion.    Respiratory:  Negative for cough, shortness of breath and wheezing.    Cardiovascular:  Negative for chest pain and leg swelling.   Psychiatric/Behavioral:  Negative for sleep disturbance.    All other systems reviewed and are negative.      Past Medical History   She has a past medical history of Personal history of other diseases of the musculoskeletal system and connective tissue.    Immunizations     Immunization History   Administered Date(s) Administered    Flu vaccine (IIV4), preservative free *Check age/dose* 10/21/2016, 2017    Flu vaccine, quadrivalent, high-dose, preservative free, age 65y+ (FLUZONE) 10/13/2020, 10/18/2021, 10/17/2023    Flu vaccine, trivalent, preservative free, HIGH-DOSE, age 65y+ (Fluzone) 2018, 2024    Flu vaccine, trivalent, preservative free, age 6 months and greater (Fluarix/Fluzone/Flulaval) 2015    Hepatitis A vaccine, age 19 years and greater (HAVRIX) 2018    Influenza, Seasonal, Quadrivalent, Adjuvanted 10/03/2022    Influenza, seasonal, injectable 2016    MMR vaccine, subcutaneous (MMR II) 2007, 10/30/2007    Moderna COVID-19 vaccine, 12 years and  older (50mcg/0.5mL)(Spikevax) 10/17/2023, 03/26/2024    Moderna COVID-19 vaccine, bivalent, blue cap/gray label *Check age/dose* 10/03/2022, 06/17/2023    Novel influenza-H1N1-09, preservative-free 11/25/2009    Pfizer COVID-19 vaccine, 12 years and older, (30mcg/0.3mL) (Comirnaty) 09/23/2024    Pneumococcal conjugate vaccine, 13-valent (PREVNAR 13) 12/14/2018    Pneumococcal polysaccharide vaccine, 23-valent, age 2 years and older (PNEUMOVAX 23) 03/11/2020    RSV, 60 Years And Older (AREXVY) 11/14/2023    Td vaccine, age 7 years and older (TDVAX) 01/30/2018    Tdap vaccine, age 7 year and older (BOOSTRIX, ADACEL) 09/04/2007, 01/01/2018    Zoster vaccine, recombinant, adult (SHINGRIX) 07/29/2019    Zoster, live 11/01/2014       Medications and Allergies   Medications:  Current Outpatient Medications   Medication Instructions    albuterol 90 mcg/actuation inhaler 2 puffs, Every 4 hours PRN    budesonide-formoteroL (Symbicort) 80-4.5 mcg/actuation inhaler 2 puffs, inhalation, 2 times daily RT, Rinse mouth with water after use to reduce aftertaste and incidence of candidiasis. Do not swallow.    calcium carbonate (Oscal) 500 mg calcium (1,250 mg) tablet 2 tablets, Daily    cetirizine (ZYRTEC) 10 mg, As needed    fluticasone (Flonase) 50 mcg/actuation nasal spray 1 spray, Daily    fluticasone furoate (Arnuity Ellipta) 100 mcg/actuation inhaler 1 puff, inhalation, Daily, Rinse mouth with water after use to reduce aftertaste and incidence of candidiasis. Do not swallow.    fluticasone furoate (Arnuity Ellipta) 50 mcg/actuation inhaler 1 puff, inhalation, Daily, Rinse mouth with water after use to reduce aftertaste and incidence of candidiasis. Do not swallow.    metoprolol tartrate (LOPRESSOR) 25 mg, oral, 2 times daily PRN        Allergies:  Amoxicillin-pot clavulanate, Cat dander, Dog hair standardized allergenic extract, Pollen extracts, Ragweed, Sulfasalazine, and Tree and shrub pollen    Social History   She reports  "that she has never smoked. She has never used smokeless tobacco. She reports current alcohol use of about 3.0 standard drinks of alcohol per week. She reports that she does not use drugs.    Smoking History: Never smoker  Exposure/Job History: Unremarkable    Family History     Family History   Problem Relation Name Age of Onset    Other (esophageal ulcer) Mother      Osteoporosis Mother      Transient ischemic attack Mother      Diabetes Father      Drug abuse Father      Hypertension Father      Breast cancer Sister      Cataracts Sister      Diabetes Sister      Hypothyroidism Sister      Lung cancer Sister         Physical Exam   VITAL SIGNS: /75   Pulse 69   Temp 36.4 °C (97.6 °F)   Ht 1.702 m (5' 7\")   Wt 64.4 kg (142 lb)   BMI 22.24 kg/m²      Physical Exam  Vitals reviewed.   Constitutional:       General: She is not in acute distress.     Appearance: She is not ill-appearing.   HENT:      Head: Normocephalic and atraumatic.      Right Ear: External ear normal.      Left Ear: External ear normal.      Mouth/Throat:      Mouth: Mucous membranes are moist.      Pharynx: Oropharynx is clear.   Eyes:      General: No scleral icterus.     Extraocular Movements: Extraocular movements intact.      Conjunctiva/sclera: Conjunctivae normal.   Cardiovascular:      Rate and Rhythm: Normal rate and regular rhythm.      Heart sounds: Normal heart sounds.   Pulmonary:      Effort: Pulmonary effort is normal. No respiratory distress.      Breath sounds: Normal breath sounds.   Abdominal:      General: Abdomen is flat.      Palpations: Abdomen is soft.   Musculoskeletal:      Cervical back: Normal range of motion and neck supple.      Right lower leg: No edema.      Left lower leg: No edema.   Skin:     General: Skin is warm and dry.      Findings: No rash.   Neurological:      General: No focal deficit present.      Mental Status: She is alert. Mental status is at baseline.   Psychiatric:         Behavior: " "Behavior normal.         Thought Content: Thought content normal.         Pulmonary Function Test Results     PFTs (1/11/2021) - Normal spirometry, lung volumes, DLCO.     FeNO (3/2025) - 46    Chest Imaging     CTA CAP (12/2023)  IMPRESSION:  1. No aortic aneurysm or acute dissection.  2. Focal narrowing at the proximal celiac artery with poststenotic  dilation. Please correlate with clinical concern for median arcuate  ligament syndrome.  3. Small hiatal hernia.  4. Colonic diverticulosis.    Echocardiogram     TTE (1/2024)  CONCLUSIONS:   1. Left ventricular systolic function is normal with a 55-60% estimated ejection fraction.   2. RVSP within normal limits.    Labs/Cultures     Lab Results   Component Value Date    WBC 8.9 03/06/2025    HGB 15.3 03/06/2025    HCT 48.8 (H) 03/06/2025    .6 (H) 03/06/2025     03/06/2025       Lab Results   Component Value Date    CREATININE 0.72 03/06/2025    BUN 16 03/06/2025     03/06/2025    K 4.4 03/06/2025     03/06/2025    CO2 28 03/06/2025        No results found for: \"MO\", \"RF\", \"SEDRATE\"     IgE: No results found for: \"IGE\"     AEC:   Eosinophils Absolute (x10*3/uL)   Date Value   12/18/2023 0.17     Eosinophils % (%)   Date Value   12/18/2023 2.0       Assessment and Plan     Linda Gomez is a 69 y.o. female presenting for follow-up of asthma.    Problem List Items Addressed This Visit       Cough variant asthma ([facility]-HCC) - Primary    Current Assessment & Plan     Will change ICS to ICS/LABA to see if this allows more flexibility for her to control symptoms.  Will also perform respiratory allergy IgE profile, consider allergy/immunology referral if multiple positive results to consider desensitization.         Relevant Medications    budesonide-formoteroL (Symbicort) 80-4.5 mcg/actuation inhaler    Other Relevant Orders    Follow Up In Pulmonology    Respiratory Allergy Profile IgE      Follow-up: 6 months    I independently reviewed " available labs, imaging, outside records and provider notes pertinent to today's visit.    Dinh Pisano MD   of Medicine, [facility] [employer]  [facility] Division of Pulmonary, Critical Care and Sleep Medicine  11:23 AM  03/14/25

## 2025-03-17 ENCOUNTER — APPOINTMENT (OUTPATIENT)
Dept: PRIMARY CARE | Facility: CLINIC | Age: 70
End: 2025-03-17
Payer: MEDICARE

## 2025-03-17 VITALS
WEIGHT: 144.8 LBS | OXYGEN SATURATION: 97 % | HEIGHT: 66 IN | SYSTOLIC BLOOD PRESSURE: 130 MMHG | TEMPERATURE: 97.5 F | HEART RATE: 76 BPM | DIASTOLIC BLOOD PRESSURE: 70 MMHG | BODY MASS INDEX: 23.27 KG/M2

## 2025-03-17 DIAGNOSIS — H61.91 EARLOBE LESION, RIGHT: Primary | ICD-10-CM

## 2025-03-17 PROCEDURE — 99213 OFFICE O/P EST LOW 20 MIN: CPT | Performed by: STUDENT IN AN ORGANIZED HEALTH CARE EDUCATION/TRAINING PROGRAM

## 2025-03-17 PROCEDURE — 1036F TOBACCO NON-USER: CPT | Performed by: STUDENT IN AN ORGANIZED HEALTH CARE EDUCATION/TRAINING PROGRAM

## 2025-03-17 PROCEDURE — 3008F BODY MASS INDEX DOCD: CPT | Performed by: STUDENT IN AN ORGANIZED HEALTH CARE EDUCATION/TRAINING PROGRAM

## 2025-03-17 PROCEDURE — 1159F MED LIST DOCD IN RCRD: CPT | Performed by: STUDENT IN AN ORGANIZED HEALTH CARE EDUCATION/TRAINING PROGRAM

## 2025-03-17 PROCEDURE — 1123F ACP DISCUSS/DSCN MKR DOCD: CPT | Performed by: STUDENT IN AN ORGANIZED HEALTH CARE EDUCATION/TRAINING PROGRAM

## 2025-03-17 RX ORDER — CEPHALEXIN 500 MG/1
500 CAPSULE ORAL 3 TIMES DAILY
Qty: 30 CAPSULE | Refills: 0 | Status: SHIPPED | OUTPATIENT
Start: 2025-03-17 | End: 2025-03-27

## 2025-03-17 RX ORDER — MUPIROCIN 20 MG/G
OINTMENT TOPICAL 3 TIMES DAILY
Qty: 22 G | Refills: 0 | Status: SHIPPED | OUTPATIENT
Start: 2025-03-17 | End: 2025-03-27

## 2025-03-17 ASSESSMENT — ENCOUNTER SYMPTOMS
WHEEZING: 0
CONSTIPATION: 0
HEADACHES: 0
DIZZINESS: 0
FREQUENCY: 0
SHORTNESS OF BREATH: 0
NAUSEA: 0
NUMBNESS: 0
FEVER: 0
DIARRHEA: 0
COUGH: 0
OCCASIONAL FEELINGS OF UNSTEADINESS: 0
DYSPHORIC MOOD: 0
HEMATURIA: 0
PALPITATIONS: 0
CHILLS: 0
DEPRESSION: 0
FATIGUE: 0
ABDOMINAL PAIN: 0
DYSURIA: 0
NERVOUS/ANXIOUS: 0

## 2025-03-17 ASSESSMENT — PATIENT HEALTH QUESTIONNAIRE - PHQ9
1. LITTLE INTEREST OR PLEASURE IN DOING THINGS: NOT AT ALL
SUM OF ALL RESPONSES TO PHQ9 QUESTIONS 1 AND 2: 0
2. FEELING DOWN, DEPRESSED OR HOPELESS: NOT AT ALL

## 2025-03-17 NOTE — PROGRESS NOTES
"Subjective   Patient ID: Linda Gomez is a 69 y.o. female who presents for redness to ear (Comes and goes x 2 months, discomfort at times).    HPI   Has redness on her external R ear x several months. Thought it was from sleeping on the R side. Kept trying to see if it would go away. Went to an  and was given 7 days of Doxy last month. Thought it was getting better but then it came back.   It is painful.   She tried to drain it but nothing will come out.   Has tried warm compresses.   Can't get into derm until September.   No fever or chills. No drainage.     Review of Systems   Constitutional:  Negative for chills, fatigue and fever.   Respiratory:  Negative for cough, shortness of breath and wheezing.    Cardiovascular:  Negative for chest pain, palpitations and leg swelling.   Gastrointestinal:  Negative for abdominal pain, constipation, diarrhea and nausea.   Genitourinary:  Negative for dysuria, frequency, hematuria and urgency.   Neurological:  Negative for dizziness, numbness and headaches.   Psychiatric/Behavioral:  Negative for dysphoric mood. The patient is not nervous/anxious.        Objective   /70 (BP Location: Right arm, Patient Position: Sitting, BP Cuff Size: Adult)   Pulse 76   Temp 36.4 °C (97.5 °F) (Temporal)   Ht 1.68 m (5' 6.14\")   Wt 65.7 kg (144 lb 12.8 oz)   SpO2 97%   BMI 23.27 kg/m²     Physical Exam  Constitutional:       Appearance: Normal appearance.   HENT:      Head: Normocephalic and atraumatic.      Right Ear: Swelling and tenderness present. There is impacted cerumen.      Ears:        Comments: Approximately 2 to 3 mm, round, erythematous, slightly raised lesion without fluctuance  Eyes:      Extraocular Movements: Extraocular movements intact.      Pupils: Pupils are equal, round, and reactive to light.   Cardiovascular:      Rate and Rhythm: Normal rate and regular rhythm.      Heart sounds: Normal heart sounds. No murmur heard.  Pulmonary:      Effort: Pulmonary " effort is normal.      Breath sounds: Normal breath sounds. No wheezing.   Musculoskeletal:         General: Normal range of motion.   Skin:     General: Skin is warm and dry.   Neurological:      General: No focal deficit present.      Mental Status: She is alert and oriented to person, place, and time.   Psychiatric:         Mood and Affect: Mood normal.         Behavior: Behavior normal.         Assessment/Plan   Problem List Items Addressed This Visit    None  Visit Diagnoses       Earlobe lesion, right    -  Primary    Relevant Medications    cephalexin (Keflex) 500 mg capsule    mupirocin (Bactroban) 2 % ointment          Will try 10-day course of Keflex as well as mupirocin.  Recommended getting into either ENT or dermatology for possible biopsy if not improving.    Linda Keating, DO  3/17/2025

## 2025-03-22 DIAGNOSIS — J45.991 COUGH VARIANT ASTHMA (HHS-HCC): ICD-10-CM

## 2025-03-24 RX ORDER — FLUTICASONE FUROATE 100 UG/1
POWDER RESPIRATORY (INHALATION)
Qty: 90 EACH | Refills: 2 | Status: SHIPPED | OUTPATIENT
Start: 2025-03-24

## 2025-03-25 ENCOUNTER — TELEPHONE (OUTPATIENT)
Dept: PULMONOLOGY | Facility: HOSPITAL | Age: 70
End: 2025-03-25
Payer: MEDICARE

## 2025-03-26 NOTE — TELEPHONE ENCOUNTER
Prior authorization for symbicort was approved. Approval #[id number] and expires 12/31/2025. Tried to call pt at [phone] regarding the approval, but was unsuccessful. Voicemail left stating that she was approved for the symbicort and to call [phone] if she has any further questions or concerns.

## 2025-05-23 ENCOUNTER — APPOINTMENT (OUTPATIENT)
Dept: RHEUMATOLOGY | Facility: CLINIC | Age: 70
End: 2025-05-23
Payer: MEDICARE

## 2025-05-23 VITALS
WEIGHT: 145.2 LBS | HEART RATE: 64 BPM | TEMPERATURE: 97.3 F | SYSTOLIC BLOOD PRESSURE: 130 MMHG | DIASTOLIC BLOOD PRESSURE: 64 MMHG | BODY MASS INDEX: 23.33 KG/M2 | OXYGEN SATURATION: 99 % | HEIGHT: 66 IN

## 2025-05-23 DIAGNOSIS — M85.80 OSTEOPENIA, UNSPECIFIED LOCATION: Primary | ICD-10-CM

## 2025-05-23 PROCEDURE — 1126F AMNT PAIN NOTED NONE PRSNT: CPT | Performed by: INTERNAL MEDICINE

## 2025-05-23 PROCEDURE — 99213 OFFICE O/P EST LOW 20 MIN: CPT | Performed by: INTERNAL MEDICINE

## 2025-05-23 PROCEDURE — 1159F MED LIST DOCD IN RCRD: CPT | Performed by: INTERNAL MEDICINE

## 2025-05-23 PROCEDURE — 3008F BODY MASS INDEX DOCD: CPT | Performed by: INTERNAL MEDICINE

## 2025-05-23 ASSESSMENT — PAIN SCALES - GENERAL: PAINLEVEL_OUTOF10: 0-NO PAIN

## 2025-05-23 NOTE — PROGRESS NOTES
She presents for follow-up evaluation without any musculoskeletal complaints.  She has not noted any joint swelling, significant morning stiffness, or any recent fractures.  She has continued to be active working in her garden and other weightbearing activity.    There is good passive range of motion of the upper and lower extremity joints without joint effusions.  There is no tenderness to palpation over the upper or lower extremities or posterior thorax.  There is no peripheral edema.    DEXA bone density T-score..  (7/12/2024)..  (6/14/2022)..  (7/19/2019)  Left total femur.............................. -2.0................... -1.6................... -1.3  Left femoral neck...........................-1.8.................... -2.0................... -2.1  L1-L4.................................................... 0.1...................... 0.1.................... 0.8    She has history of ulcerative colitis, asthma, hyperlipidemia, and osteopenia currently off antiresorptive therapy other than calcium vitamin D and weightbearing exercise.  She had previously been treated with Prolia 5/2/2019 through 5/20/2020 followed by Fosamax 5/20/2020 through 2/20/2022.      She is to have repeat laboratory for serum's C telopeptide after the next office visit have repeat bone mineral density study after 7/12/2026.  She is to return for follow-up evaluation in 6 months.

## 2025-06-27 ENCOUNTER — OFFICE VISIT (OUTPATIENT)
Dept: PRIMARY CARE | Facility: CLINIC | Age: 70
End: 2025-06-27
Payer: MEDICARE

## 2025-06-27 VITALS
BODY MASS INDEX: 23.14 KG/M2 | HEART RATE: 74 BPM | OXYGEN SATURATION: 99 % | SYSTOLIC BLOOD PRESSURE: 124 MMHG | DIASTOLIC BLOOD PRESSURE: 74 MMHG | TEMPERATURE: 97.6 F | WEIGHT: 144 LBS

## 2025-06-27 DIAGNOSIS — G57.01 PIRIFORMIS SYNDROME OF RIGHT SIDE: Primary | ICD-10-CM

## 2025-06-27 PROCEDURE — 1160F RVW MEDS BY RX/DR IN RCRD: CPT | Performed by: STUDENT IN AN ORGANIZED HEALTH CARE EDUCATION/TRAINING PROGRAM

## 2025-06-27 PROCEDURE — 1036F TOBACCO NON-USER: CPT | Performed by: STUDENT IN AN ORGANIZED HEALTH CARE EDUCATION/TRAINING PROGRAM

## 2025-06-27 PROCEDURE — 1159F MED LIST DOCD IN RCRD: CPT | Performed by: STUDENT IN AN ORGANIZED HEALTH CARE EDUCATION/TRAINING PROGRAM

## 2025-06-27 PROCEDURE — 99214 OFFICE O/P EST MOD 30 MIN: CPT | Performed by: STUDENT IN AN ORGANIZED HEALTH CARE EDUCATION/TRAINING PROGRAM

## 2025-06-27 RX ORDER — DICLOFENAC SODIUM 75 MG/1
75 TABLET, DELAYED RELEASE ORAL 2 TIMES DAILY PRN
Qty: 60 TABLET | Refills: 1 | Status: SHIPPED | OUTPATIENT
Start: 2025-06-27

## 2025-06-27 ASSESSMENT — ENCOUNTER SYMPTOMS
HEADACHES: 0
NUMBNESS: 0
CHILLS: 0
OCCASIONAL FEELINGS OF UNSTEADINESS: 0
DIZZINESS: 0
FEVER: 0
NAUSEA: 0
CONSTIPATION: 0
FATIGUE: 0
FREQUENCY: 0
WHEEZING: 0
COUGH: 0
DIARRHEA: 0
PALPITATIONS: 0
DYSPHORIC MOOD: 0
NERVOUS/ANXIOUS: 0
DYSURIA: 0
DEPRESSION: 0
HEMATURIA: 0
SHORTNESS OF BREATH: 0
ABDOMINAL PAIN: 0

## 2025-06-27 NOTE — PROGRESS NOTES
Subjective   Patient ID: Linda Gomez is a 70 y.o. female who presents for left sided buttocks pain (Started 8-9 days ago/Radiation into the leg, no numbness noted in foot).    HPI   Patient thinks she has piriformis syndrome. Pain on the L side of her posterior hip. Radiates down her lateral and anterior thigh.  Started about 9 days ago.  She has been doing more gardening and working outside.    Ibuprofen helps somewhat.   Flared up when walking for awhile and felt good, drove home and felt ok but then started hurting her afterwards. Had to eat standing up. That helped. Sitting for too long seems to bother her.   Has been doing some stretches.  No n/t  Feels better to lean back.  No bowel or bladder incontinence. No saddle anesthesia. No back pain.     Saw derm for the lesion on her ear. She has to avoid pressure on her, sleeps on a donut pillow. Chondrodermatitis nodularis helicis.      Review of Systems   Constitutional:  Negative for chills, fatigue and fever.   Respiratory:  Negative for cough, shortness of breath and wheezing.    Cardiovascular:  Negative for chest pain, palpitations and leg swelling.   Gastrointestinal:  Negative for abdominal pain, constipation, diarrhea and nausea.   Genitourinary:  Negative for dysuria, frequency, hematuria and urgency.   Neurological:  Negative for dizziness, numbness and headaches.   Psychiatric/Behavioral:  Negative for dysphoric mood. The patient is not nervous/anxious.        Objective   /74 (BP Location: Right arm, Patient Position: Sitting, BP Cuff Size: Adult)   Pulse 74   Temp 36.4 °C (97.6 °F) (Temporal)   Wt 65.3 kg (144 lb)   SpO2 99%   BMI 23.14 kg/m²     Physical Exam  Vitals reviewed.   Constitutional:       Appearance: Normal appearance.   HENT:      Head: Normocephalic and atraumatic.   Eyes:      Extraocular Movements: Extraocular movements intact.      Pupils: Pupils are equal, round, and reactive to light.   Pulmonary:      Effort: Pulmonary  effort is normal.   Musculoskeletal:      Lumbar back: Negative right straight leg raise test and negative left straight leg raise test.   Skin:     General: Skin is warm and dry.   Neurological:      General: No focal deficit present.      Mental Status: She is alert.   Psychiatric:         Mood and Affect: Mood normal.         Behavior: Behavior normal.         Thought Content: Thought content normal.     Right Hip Exam     Tenderness   The patient is experiencing tenderness in the posterior.    Range of Motion   Abduction:  abnormal   Adduction:  abnormal   Flexion:  abnormal   External rotation:  abnormal   Internal rotation:  abnormal     Muscle Strength   Abduction: 4/5   Adduction: 4/5   Flexion: 4/5     Tests   RENETTA: negative  Brian: negative      Left Hip Exam     Tenderness   The patient is experiencing no tenderness.     Range of Motion   Abduction:  abnormal   Adduction:  abnormal   Flexion:  abnormal   External rotation:  abnormal   Internal rotation: abnormal     Muscle Strength   Abduction: 4/5   Adduction: 4/5   Flexion: 4/5     Tests   RENETTA: negative  Brian: negative      Back Exam     Tests   Straight leg raise right: negative  Straight leg raise left: negative    Other   Sensation: normal              Assessment/Plan   Problem List Items Addressed This Visit    None  Visit Diagnoses         Piriformis syndrome of right side    -  Primary    Relevant Medications    diclofenac (Voltaren) 75 mg EC tablet    Other Relevant Orders    Referral to Physical Therapy          Referral for physical therapy.  Recommended NSAIDs for the next 7 to 10 days and then as needed after that    Linda Keating,   6/27/2025

## 2025-06-30 ENCOUNTER — TELEPHONE (OUTPATIENT)
Dept: PRIMARY CARE | Facility: CLINIC | Age: 70
End: 2025-06-30
Payer: MEDICARE

## 2025-06-30 DIAGNOSIS — G57.01 PIRIFORMIS SYNDROME OF RIGHT SIDE: Primary | ICD-10-CM

## 2025-06-30 RX ORDER — PREDNISONE 20 MG/1
40 TABLET ORAL
Qty: 10 TABLET | Refills: 0 | Status: SHIPPED | OUTPATIENT
Start: 2025-06-30 | End: 2025-07-05

## 2025-06-30 RX ORDER — CELECOXIB 200 MG/1
200 CAPSULE ORAL DAILY PRN
Qty: 30 CAPSULE | Refills: 0 | Status: SHIPPED | OUTPATIENT
Start: 2025-06-30 | End: 2025-12-27

## 2025-06-30 NOTE — TELEPHONE ENCOUNTER
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    Pt is requesting another anti-inflammatory be sent in with a short termsteroid to help.    [facility]   [phone]    Allergies=  Augmentin  Sulfasalazine     Please review and advise

## 2025-07-07 ENCOUNTER — TELEPHONE (OUTPATIENT)
Dept: PRIMARY CARE | Facility: CLINIC | Age: 70
End: 2025-07-07
Payer: MEDICARE

## 2025-07-07 NOTE — TELEPHONE ENCOUNTER
Pt called to state was seen 06/27/25 with Dr Keating  and called in last week about back pain and Left sided Buttocks pain and is going to second physical therapy appointment today, per pt the Celebrex and Prednisone are not helping at all.    Pt now states that she is have Right side buttocks pain and hip pain, per pt she has had that hip replaced.  Pt wondering what to do next, Xrays, now only taking Tylenol which is not helpful either.    Dr Keating pt    Allergy=  Augmentin, Sulfasalazine     Please review and advise

## 2025-07-07 NOTE — TELEPHONE ENCOUNTER
Does the patient have an orthopedic doctor?  She can call her orthopedic doctor to see if they can get her into get her evaluated.  If things are not getting better with standard treatment for piriformis syndrome she likely needs to be reevaluated with an appointment.

## 2025-07-08 ENCOUNTER — OFFICE VISIT (OUTPATIENT)
Dept: PRIMARY CARE | Facility: CLINIC | Age: 70
End: 2025-07-08
Payer: MEDICARE

## 2025-07-08 VITALS — SYSTOLIC BLOOD PRESSURE: 126 MMHG | DIASTOLIC BLOOD PRESSURE: 78 MMHG

## 2025-07-08 DIAGNOSIS — M54.42 ACUTE BILATERAL LOW BACK PAIN WITH LEFT-SIDED SCIATICA: Primary | ICD-10-CM

## 2025-07-08 PROCEDURE — 1160F RVW MEDS BY RX/DR IN RCRD: CPT | Performed by: INTERNAL MEDICINE

## 2025-07-08 PROCEDURE — 99213 OFFICE O/P EST LOW 20 MIN: CPT | Performed by: INTERNAL MEDICINE

## 2025-07-08 PROCEDURE — 1159F MED LIST DOCD IN RCRD: CPT | Performed by: INTERNAL MEDICINE

## 2025-07-08 PROCEDURE — 1036F TOBACCO NON-USER: CPT | Performed by: INTERNAL MEDICINE

## 2025-07-08 RX ORDER — CYCLOBENZAPRINE HCL 5 MG
5 TABLET ORAL 2 TIMES DAILY PRN
Qty: 10 TABLET | Refills: 0 | Status: SHIPPED | OUTPATIENT
Start: 2025-07-08 | End: 2025-07-22

## 2025-07-08 NOTE — PROGRESS NOTES
"Subjective   Patient ID: Linda Gomez is a 70 y.o. female who presents for low back pain, bilateral hip/ buttock pain x few weeks.    HPI     2 weeks PTV w/ onset LBP.  Began after moving furniture/\"heavy\" yardwork  Pain LLB/buttock--> left lateral leg.  Rx w/ prednisone-> no significant change.     Has been using 1 Celebrex daily for the past 2 days without significant relief.  2 days PTV w/ onset of similar pain on rt.  --> buttock w/o radiation    No relief/ (worse) w/ PT    No f/c, NS.  No weakness  No bowel or bladder changes.  No groin pain/paresthesias.  No rash.  No fevers or chills.  No real trauma.    Increased w/ walking.   Review of Systems    Objective   /78 (BP Location: Left arm, Patient Position: Sitting)     Physical Exam  Alert and oriented x 3.  No acute distress.  Some tenderness to palpation left and right mid buttock.  No pain over lumbar spine.  No bony abnormalities.  Negative straight leg raise bilaterally.  DTRs 1+ bilaterally.  Strength normal bilateral lower extremities.  Lab Results   Component Value Date    WBC 8.9 03/06/2025    HGB 15.3 03/06/2025    HCT 48.8 (H) 03/06/2025     03/06/2025    CHOL 239 (H) 03/06/2025    TRIG 95 03/06/2025    HDL 82 03/06/2025    ALT 14 12/18/2023    AST 27 12/18/2023     03/06/2025    K 4.4 03/06/2025     03/06/2025    CREATININE 0.72 03/06/2025    BUN 16 03/06/2025    CO2 28 03/06/2025    TSH 2.48 02/09/2024       Assessment/Plan   Assessment & Plan  Acute bilateral low back pain with left-sided sciatica    Orders:    XR lumbar spine 2-3 views; Future    Referral to Pain Medicine; Future    cyclobenzaprine (Flexeril) 5 mg tablet; Take 1 tablet (5 mg) by mouth 2 times a day as needed for muscle spasms for up to 14 days.            Bilateral low back pain.  Suspect musculoskeletal.  Normal neurologic exam.  Increase Celebrex to 200 mg twice daily for 5 days.  Acetaminophen 500 mg 3 times daily as needed.  Cyclobenzaprine twice " daily as needed-reviewed use and risks of this medicine.  Reviewed sedation, no driving.  If not improved over the next 4 to 5 days will obtain x-ray and consult pain medicine.  Orders entered.

## 2025-07-12 DIAGNOSIS — M54.42 ACUTE BILATERAL LOW BACK PAIN WITH LEFT-SIDED SCIATICA: Primary | ICD-10-CM

## 2025-07-12 RX ORDER — TRAMADOL HYDROCHLORIDE 50 MG/1
50 TABLET, FILM COATED ORAL EVERY 8 HOURS PRN
Qty: 15 TABLET | Refills: 0 | Status: SHIPPED | OUTPATIENT
Start: 2025-07-12 | End: 2025-07-17

## 2025-07-13 DIAGNOSIS — M54.42 ACUTE BILATERAL LOW BACK PAIN WITH LEFT-SIDED SCIATICA: ICD-10-CM

## 2025-07-14 RX ORDER — CYCLOBENZAPRINE HCL 5 MG
5 TABLET ORAL 2 TIMES DAILY PRN
Qty: 10 TABLET | Refills: 0 | Status: SHIPPED | OUTPATIENT
Start: 2025-07-14 | End: 2025-07-28

## 2025-07-16 ENCOUNTER — HOSPITAL ENCOUNTER (OUTPATIENT)
Dept: RADIOLOGY | Facility: HOSPITAL | Age: 70
Discharge: HOME | End: 2025-07-16
Payer: MEDICARE

## 2025-07-16 ENCOUNTER — OFFICE VISIT (OUTPATIENT)
Dept: PAIN MEDICINE | Facility: HOSPITAL | Age: 70
End: 2025-07-16
Payer: MEDICARE

## 2025-07-16 DIAGNOSIS — R26.2 UNABLE TO AMBULATE: ICD-10-CM

## 2025-07-16 DIAGNOSIS — M81.0 OSTEOPOROSIS, UNSPECIFIED OSTEOPOROSIS TYPE, UNSPECIFIED PATHOLOGICAL FRACTURE PRESENCE: ICD-10-CM

## 2025-07-16 DIAGNOSIS — R29.898 BILATERAL LEG WEAKNESS: ICD-10-CM

## 2025-07-16 DIAGNOSIS — M54.17 LUMBOSACRAL RADICULOPATHY: Primary | ICD-10-CM

## 2025-07-16 DIAGNOSIS — M54.17 LUMBOSACRAL RADICULOPATHY: ICD-10-CM

## 2025-07-16 DIAGNOSIS — K51.20 ULCERATIVE PROCTITIS WITHOUT COMPLICATION (MULTI): ICD-10-CM

## 2025-07-16 PROCEDURE — 1125F AMNT PAIN NOTED PAIN PRSNT: CPT | Performed by: PAIN MEDICINE

## 2025-07-16 PROCEDURE — 99214 OFFICE O/P EST MOD 30 MIN: CPT | Performed by: PAIN MEDICINE

## 2025-07-16 PROCEDURE — 72110 X-RAY EXAM L-2 SPINE 4/>VWS: CPT | Performed by: RADIOLOGY

## 2025-07-16 PROCEDURE — 72120 X-RAY BEND ONLY L-S SPINE: CPT

## 2025-07-16 PROCEDURE — 99204 OFFICE O/P NEW MOD 45 MIN: CPT | Performed by: PAIN MEDICINE

## 2025-07-16 RX ORDER — GABAPENTIN 300 MG/1
300 CAPSULE ORAL NIGHTLY
Qty: 30 CAPSULE | Refills: 3 | Status: SHIPPED | OUTPATIENT
Start: 2025-07-16 | End: 2025-11-13

## 2025-07-16 ASSESSMENT — PAIN - FUNCTIONAL ASSESSMENT: PAIN_FUNCTIONAL_ASSESSMENT: 0-10

## 2025-07-16 ASSESSMENT — PAIN DESCRIPTION - DESCRIPTORS: DESCRIPTORS: NUMBNESS;TINGLING;SHOOTING

## 2025-07-16 ASSESSMENT — PAIN SCALES - GENERAL: PAINLEVEL_OUTOF10: 8

## 2025-07-16 NOTE — PROGRESS NOTES
Subjective   Patient ID: Linda Gomez is a 70 y.o. female with a past medical history of osteopenia who presents with new onset lumbar radiculopathy.       HPI:   Linda is a 70 yr old female with a past medical history of osteopenia who presents with new onset lumbar radiculopathy. She first noticed left lower back/buttocks pain following a long day of gardening around the date of 6/22, which quickly progressed into right sided radicular pain as well. The sharp pain on the right extends down her posterolateral thigh all the way to her right foot. She was started on celecoxib, flexeril, and tramadol over the course of these three weeks. She was also given prednisone with no help. Physical therapy, which she tried twice, exacerbated the symptoms. She has not gotten an xray or MRI due to not being able to leave the house from her pain. Pain can be a 9 or 10/10. Linda is currently using crutches in the office to relieve pressure off of her vertebrae in lieu of sitting.     Review of Systems   13-point ROS done and negative except for HPI.     Current Outpatient Medications   Medication Instructions    albuterol 90 mcg/actuation inhaler 2 puffs, Every 4 hours PRN    budesonide-formoteroL (Symbicort) 80-4.5 mcg/actuation inhaler 2 puffs, inhalation, 2 times daily RT, Rinse mouth with water after use to reduce aftertaste and incidence of candidiasis. Do not swallow.    calcium carbonate (Oscal) 500 mg calcium (1,250 mg) tablet 2 tablets, Daily    celecoxib (CELEBREX) 200 mg, oral, Daily PRN    cetirizine (ZYRTEC) 10 mg, As needed    cyclobenzaprine (FLEXERIL) 5 mg, oral, 2 times daily PRN    fluticasone (Flonase) 50 mcg/actuation nasal spray 1 spray, Daily    metoprolol tartrate (LOPRESSOR) 25 mg, oral, 2 times daily PRN    traMADol (ULTRAM) 50 mg, oral, Every 8 hours PRN       Medical History[1]     Surgical History[2]     Family History[3]     RX Allergies[4]     Objective     There were no vitals filed for this visit.      Physical Exam  General: NAD, well groomed, well nourished, standing on crutches  Eyes: Non-icteric sclera, EOMI  Ears, Nose, Mouth, and Throat: External ears and nose appear to be without deformity or rash. No lesions or masses noted. Hearing is grossly intact.   Neck: Trachea midline  Respiratory: Nonlabored breathing   Cardiovascular: no peripheral edema   Skin: No rashes or open lesions/ulcers identified on skin.  Back: In severe pain sitting on exam table or standing without crutches    Assessment/Plan   Linda is a 70 year old female with new onset radicular pain bilaterally without any imaging. On celecoxib and muscle relaxers without relief. PT exacerbated symptoms.      Plan:  -lumbar xrays today  -urgent lumbar MRI  -Gabapentin 300mg at night      The patient was invited to contact us back anytime with any questions or concerns and follow-up with us in the office as needed.     There are no diagnoses linked to this encounter.    This note was generated with the aid of dictation software, there may be typos despite my attempts at proofreading.          [1]   Past Medical History:  Diagnosis Date    Joint pain 2000    Personal history of other diseases of the musculoskeletal system and connective tissue     History of osteoarthritis   [2]   Past Surgical History:  Procedure Laterality Date    ANKLE SURGERY  01/13/2015    Ankle Surgery    FRACTURE SURGERY  2009-patella    JOINT REPLACEMENT  2004-hip    KNEE ARTHROSCOPY W/ DEBRIDEMENT  01/30/2014    Arthroscopy Knee Right    ORTHOPEDIC SURGERY  2014-ankle/foot, 2007-rotator cuff    OTHER SURGICAL HISTORY  01/30/2014    Reported Hx Of Hip Replacement - Right Side    OTHER SURGICAL HISTORY  01/30/2014    Treatment Of Knee Fracture Patellar, Open    ROTATOR CUFF REPAIR  01/30/2014    Rotator Cuff Repair   [3]   Family History  Problem Relation Name Age of Onset    Other (esophageal ulcer) Mother      Osteoporosis Mother      Transient ischemic attack Mother       Diabetes Father      Drug abuse Father      Hypertension Father      Breast cancer Sister      Cataracts Sister      Diabetes Sister      Hypothyroidism Sister      Lung cancer Sister     [4]   Allergies  Allergen Reactions    Amoxicillin-Pot Clavulanate Unknown     GI issues    Cat Dander Other     itchy nose    Dog Hair Standardized Allergenic Extract Other     tchy nose    Pollen Extracts Other     sneezing, runny nose    Ragweed Other     sneezing, runny nose    Sulfasalazine Other     GI issues    Tree And Shrub Pollen Wheezing

## 2025-07-17 ENCOUNTER — HOSPITAL ENCOUNTER (OUTPATIENT)
Dept: RADIOLOGY | Facility: HOSPITAL | Age: 70
Discharge: HOME | End: 2025-07-17
Payer: MEDICARE

## 2025-07-17 DIAGNOSIS — R26.2 UNABLE TO AMBULATE: ICD-10-CM

## 2025-07-17 DIAGNOSIS — M54.17 LUMBOSACRAL RADICULOPATHY: ICD-10-CM

## 2025-07-17 DIAGNOSIS — R29.898 BILATERAL LEG WEAKNESS: ICD-10-CM

## 2025-07-17 DIAGNOSIS — M54.42 ACUTE BILATERAL LOW BACK PAIN WITH LEFT-SIDED SCIATICA: ICD-10-CM

## 2025-07-17 DIAGNOSIS — M81.0 OSTEOPOROSIS, UNSPECIFIED OSTEOPOROSIS TYPE, UNSPECIFIED PATHOLOGICAL FRACTURE PRESENCE: ICD-10-CM

## 2025-07-17 PROCEDURE — 72148 MRI LUMBAR SPINE W/O DYE: CPT

## 2025-07-18 ENCOUNTER — TELEPHONE (OUTPATIENT)
Dept: PAIN MEDICINE | Facility: HOSPITAL | Age: 70
End: 2025-07-18
Payer: MEDICARE

## 2025-07-18 DIAGNOSIS — M54.16 LUMBAR RADICULOPATHY: ICD-10-CM

## 2025-07-18 NOTE — TELEPHONE ENCOUNTER
Called Mrs Gomez  to relay MRI results  per Dr Nelson instruction-  L5 disc herniation with moderate to severe foraminal stenosis. Offered l5/s1 kasi.  Patient states  she would like to speak with her   and talk to ortho surgery (has appt next week) before deciding on injection. She will call back when she is ready to schedule.

## 2025-07-21 DIAGNOSIS — M54.42 ACUTE BILATERAL LOW BACK PAIN WITH LEFT-SIDED SCIATICA: ICD-10-CM

## 2025-07-21 RX ORDER — CYCLOBENZAPRINE HCL 5 MG
5 TABLET ORAL 2 TIMES DAILY PRN
Qty: 10 TABLET | Refills: 0 | Status: SHIPPED | OUTPATIENT
Start: 2025-07-21 | End: 2025-08-04

## 2025-07-21 RX ORDER — CYCLOBENZAPRINE HCL 5 MG
5 TABLET ORAL 2 TIMES DAILY PRN
Qty: 10 TABLET | Refills: 0 | OUTPATIENT
Start: 2025-07-21

## 2025-07-22 ENCOUNTER — TELEPHONE (OUTPATIENT)
Dept: PRIMARY CARE | Facility: CLINIC | Age: 70
End: 2025-07-22

## 2025-07-22 ENCOUNTER — OFFICE VISIT (OUTPATIENT)
Dept: URGENT CARE | Age: 70
End: 2025-07-22
Payer: MEDICARE

## 2025-07-22 ENCOUNTER — APPOINTMENT (OUTPATIENT)
Dept: RADIOLOGY | Facility: HOSPITAL | Age: 70
End: 2025-07-22
Payer: MEDICARE

## 2025-07-22 ENCOUNTER — HOSPITAL ENCOUNTER (EMERGENCY)
Facility: HOSPITAL | Age: 70
Discharge: HOME | End: 2025-07-22
Attending: GENERAL PRACTICE
Payer: MEDICARE

## 2025-07-22 VITALS
DIASTOLIC BLOOD PRESSURE: 72 MMHG | HEIGHT: 66 IN | SYSTOLIC BLOOD PRESSURE: 127 MMHG | OXYGEN SATURATION: 98 % | WEIGHT: 142 LBS | TEMPERATURE: 97.9 F | BODY MASS INDEX: 22.82 KG/M2 | RESPIRATION RATE: 20 BRPM | HEART RATE: 79 BPM

## 2025-07-22 VITALS
OXYGEN SATURATION: 97 % | SYSTOLIC BLOOD PRESSURE: 132 MMHG | TEMPERATURE: 97.7 F | DIASTOLIC BLOOD PRESSURE: 82 MMHG | HEART RATE: 76 BPM | RESPIRATION RATE: 17 BRPM

## 2025-07-22 DIAGNOSIS — R60.0 LOWER EXTREMITY EDEMA: Primary | ICD-10-CM

## 2025-07-22 DIAGNOSIS — I82.452 ACUTE DEEP VEIN THROMBOSIS (DVT) OF LEFT PERONEAL VEIN (MULTI): ICD-10-CM

## 2025-07-22 DIAGNOSIS — R22.42 LOCALIZED SWELLING, MASS, OR LUMP OF LEFT LOWER EXTREMITY: Primary | ICD-10-CM

## 2025-07-22 LAB
ALBUMIN SERPL BCP-MCNC: 4.4 G/DL (ref 3.4–5)
ALP SERPL-CCNC: 107 U/L (ref 33–136)
ALT SERPL W P-5'-P-CCNC: 16 U/L (ref 7–45)
ANION GAP SERPL CALC-SCNC: 11 MMOL/L (ref 10–20)
AST SERPL W P-5'-P-CCNC: 22 U/L (ref 9–39)
BASOPHILS # BLD AUTO: 0.08 X10*3/UL (ref 0–0.1)
BASOPHILS NFR BLD AUTO: 1 %
BILIRUB SERPL-MCNC: 0.8 MG/DL (ref 0–1.2)
BUN SERPL-MCNC: 15 MG/DL (ref 6–23)
CALCIUM SERPL-MCNC: 10 MG/DL (ref 8.6–10.3)
CHLORIDE SERPL-SCNC: 103 MMOL/L (ref 98–107)
CO2 SERPL-SCNC: 30 MMOL/L (ref 21–32)
CREAT SERPL-MCNC: 0.74 MG/DL (ref 0.5–1.05)
EGFRCR SERPLBLD CKD-EPI 2021: 86 ML/MIN/1.73M*2
EOSINOPHIL # BLD AUTO: 0.35 X10*3/UL (ref 0–0.4)
EOSINOPHIL NFR BLD AUTO: 4.5 %
ERYTHROCYTE [DISTWIDTH] IN BLOOD BY AUTOMATED COUNT: 12.9 % (ref 11.5–14.5)
GLUCOSE SERPL-MCNC: 99 MG/DL (ref 74–99)
HCT VFR BLD AUTO: 44.4 % (ref 36–46)
HGB BLD-MCNC: 14.3 G/DL (ref 12–16)
IMM GRANULOCYTES # BLD AUTO: 0.02 X10*3/UL (ref 0–0.5)
IMM GRANULOCYTES NFR BLD AUTO: 0.3 % (ref 0–0.9)
LYMPHOCYTES # BLD AUTO: 2.23 X10*3/UL (ref 0.8–3)
LYMPHOCYTES NFR BLD AUTO: 28.7 %
MCH RBC QN AUTO: 31 PG (ref 26–34)
MCHC RBC AUTO-ENTMCNC: 32.2 G/DL (ref 32–36)
MCV RBC AUTO: 96 FL (ref 80–100)
MONOCYTES # BLD AUTO: 0.43 X10*3/UL (ref 0.05–0.8)
MONOCYTES NFR BLD AUTO: 5.5 %
NEUTROPHILS # BLD AUTO: 4.67 X10*3/UL (ref 1.6–5.5)
NEUTROPHILS NFR BLD AUTO: 60 %
NRBC BLD-RTO: 0 /100 WBCS (ref 0–0)
PLATELET # BLD AUTO: 374 X10*3/UL (ref 150–450)
POTASSIUM SERPL-SCNC: 4.2 MMOL/L (ref 3.5–5.3)
PROT SERPL-MCNC: 7.9 G/DL (ref 6.4–8.2)
RBC # BLD AUTO: 4.61 X10*6/UL (ref 4–5.2)
SODIUM SERPL-SCNC: 140 MMOL/L (ref 136–145)
WBC # BLD AUTO: 7.8 X10*3/UL (ref 4.4–11.3)

## 2025-07-22 PROCEDURE — 85025 COMPLETE CBC W/AUTO DIFF WBC: CPT

## 2025-07-22 PROCEDURE — 36415 COLL VENOUS BLD VENIPUNCTURE: CPT

## 2025-07-22 PROCEDURE — 93971 EXTREMITY STUDY: CPT

## 2025-07-22 PROCEDURE — 99284 EMERGENCY DEPT VISIT MOD MDM: CPT | Mod: 25 | Performed by: GENERAL PRACTICE

## 2025-07-22 PROCEDURE — 76882 US LMTD JT/FCL EVL NVASC XTR: CPT | Mod: FOREIGN READ | Performed by: RADIOLOGY

## 2025-07-22 PROCEDURE — 80053 COMPREHEN METABOLIC PANEL: CPT

## 2025-07-22 ASSESSMENT — PAIN DESCRIPTION - PAIN TYPE: TYPE: ACUTE PAIN;CHRONIC PAIN

## 2025-07-22 ASSESSMENT — PAIN DESCRIPTION - DESCRIPTORS: DESCRIPTORS: ACHING

## 2025-07-22 ASSESSMENT — PAIN DESCRIPTION - LOCATION: LOCATION: BUTTOCKS

## 2025-07-22 ASSESSMENT — PAIN SCALES - GENERAL: PAINLEVEL_OUTOF10: 1

## 2025-07-22 ASSESSMENT — PAIN - FUNCTIONAL ASSESSMENT: PAIN_FUNCTIONAL_ASSESSMENT: 0-10

## 2025-07-22 ASSESSMENT — PAIN DESCRIPTION - ORIENTATION: ORIENTATION: RIGHT

## 2025-07-22 ASSESSMENT — ENCOUNTER SYMPTOMS: CONSTITUTIONAL NEGATIVE: 1

## 2025-07-22 NOTE — DISCHARGE INSTRUCTIONS
Return to the ED immediately if new or worsening symptoms  Please follow-up with your primary care doctor and vascular medicine within 3 days

## 2025-07-22 NOTE — ED TRIAGE NOTES
Pt coming in today for left leg swelling. Was sent in from urgent care for an ultrasound. Denies any trauma, pain, or DVTs in the left leg. No ton thinners.

## 2025-07-22 NOTE — PATIENT INSTRUCTIONS
Please go to [facility] ED now, to get more work ups such as Doppler Ultrasounfd to rule out  Blood clot formatio in your Left lower leg.

## 2025-07-22 NOTE — PROGRESS NOTES
Subjective   Patient ID: Linda Gomez is a 70 y.o. female. They present today with a chief complaint of swelling in left leg (Left leg swelling x 2weeks and discoloration xtoday /Left foot feels colder then normal /No known injury).    History of Present Illness  HPI    Past Medical History  Allergies as of 07/22/2025 - Reviewed 07/22/2025   Allergen Reaction Noted    Amoxicillin-pot clavulanate Unknown 12/04/2019    Cat dander Other 09/04/2007    Dog hair standardized allergenic extract Other 09/04/2007    Pollen extracts Other 09/04/2007    Ragweed Other 09/04/2007    Sulfasalazine Other 09/14/2023    Tree and shrub pollen Wheezing 08/02/2024       Prescriptions Prior to Admission[1]     Medical History[2]    Surgical History[3]     reports that she has never smoked. She has never been exposed to tobacco smoke. She has never used smokeless tobacco. She reports current alcohol use of about 3.0 standard drinks of alcohol per week. She reports that she does not use drugs.    Review of Systems  Review of Systems   Constitutional: Negative.    Musculoskeletal:  Positive for gait problem.                                  Objective    Vitals:    07/22/25 1253   BP: 132/82   BP Location: Left arm   Patient Position: Sitting   Pulse: 76   Resp: 17   Temp: 36.5 °C (97.7 °F)   SpO2: 97%     No LMP recorded. Patient is postmenopausal.    Physical Exam  Constitutional:       Appearance: Normal appearance.     Skin:     Comments: Diffuse swelling of left calf area, there is a 3cm subcutaneous mass on left lower inner leg above ankle.     Neurological:      Mental Status: She is alert.         Procedures    Point of Care Test & Imaging Results from this visit  No results found for this visit on 07/22/25.   Imaging  No results found.    Cardiology, Vascular, and Other Imaging  No other imaging results found for the past 2 days      Diagnostic study results (if any) were reviewed by Sunshine Taylor MD.    Assessment/Plan    Allergies, medications, history, and pertinent labs/EKGs/Imaging reviewed by Sunshine Taylor MD.     Medical Decision Making   Swelling of left lower leg in a female patient needs more work ups such as Doppler Ultrasound in ED.    Orders and Diagnoses  There are no diagnoses linked to this encounter.    Medical Admin Record      Patient disposition: ED    Electronically signed by Sunshine Taylor MD  1:15 PM           [1] (Not in a hospital admission)  [2]   Past Medical History:  Diagnosis Date    Joint pain 2000    Personal history of other diseases of the musculoskeletal system and connective tissue     History of osteoarthritis   [3]   Past Surgical History:  Procedure Laterality Date    ANKLE SURGERY  01/13/2015    Ankle Surgery    FRACTURE SURGERY  2009-patella    JOINT REPLACEMENT  2004-hip    KNEE ARTHROSCOPY W/ DEBRIDEMENT  01/30/2014    Arthroscopy Knee Right    ORTHOPEDIC SURGERY  2014-ankle/foot, 2007-rotator cuff    OTHER SURGICAL HISTORY  01/30/2014    Reported Hx Of Hip Replacement - Right Side    OTHER SURGICAL HISTORY  01/30/2014    Treatment Of Knee Fracture Patellar, Open    ROTATOR CUFF REPAIR  01/30/2014    Rotator Cuff Repair

## 2025-07-23 ENCOUNTER — OFFICE VISIT (OUTPATIENT)
Dept: ORTHOPEDIC SURGERY | Facility: CLINIC | Age: 70
End: 2025-07-23
Payer: MEDICARE

## 2025-07-23 DIAGNOSIS — M54.16 LUMBAR RADICULOPATHY: Primary | ICD-10-CM

## 2025-07-23 PROCEDURE — 99212 OFFICE O/P EST SF 10 MIN: CPT | Performed by: ORTHOPAEDIC SURGERY

## 2025-07-23 PROCEDURE — 99214 OFFICE O/P EST MOD 30 MIN: CPT | Performed by: ORTHOPAEDIC SURGERY

## 2025-07-23 PROCEDURE — 1036F TOBACCO NON-USER: CPT | Performed by: ORTHOPAEDIC SURGERY

## 2025-07-23 PROCEDURE — 1159F MED LIST DOCD IN RCRD: CPT | Performed by: ORTHOPAEDIC SURGERY

## 2025-07-23 PROCEDURE — 1125F AMNT PAIN NOTED PAIN PRSNT: CPT | Performed by: ORTHOPAEDIC SURGERY

## 2025-07-23 ASSESSMENT — PAIN SCALES - GENERAL: PAINLEVEL_OUTOF10: 2

## 2025-07-23 ASSESSMENT — PAIN - FUNCTIONAL ASSESSMENT: PAIN_FUNCTIONAL_ASSESSMENT: 0-10

## 2025-07-23 NOTE — PROGRESS NOTES
70-year-old female chief complaint of right greater than left leg radicular symptoms.  Her symptoms began spontaneously a few months ago.  She has been treated with a few courses of physical therapy, seem to be making the problem worse.  Throughout the course of her treatment she was ultimately diagnosed with a peroneal blood clot on the left leg.  She is currently taking Eliquis.    Gabapentin is helping, this was prescribed by a pain management physician.    She is otherwise in fairly good health.  She does not smoke.    On exam she is well-appearing and in no distress.  BMI 22.9.  Normal gait.  No focal neurologic deficits.    I personally reviewed MRI of her lumbar spine.  This demonstrates a large, broad disc herniation at L5-S1 causing severe lateral recess stenosis bilaterally.    70-year-old female with lumbar radiculopathy due to disc herniation and stenosis at L5-S1.  Unfortunately we cannot provide any or aggressive measures such as epidural injection or even surgical intervention given the fact that she is on blood thinners for newly diagnosed peroneal DVT.    I encouraged her to continue to stay active, use gabapentin and Tylenol, and follow-up with me after she is able to stop her blood thinners.  Depending on the severity of the symptoms we can discuss injections versus surgery at that time.    *This note was dictated using speech recognition software and was not corrected for spelling or grammatical errors*    Medical History[1]    Current Medications[2]     Social History     Socioeconomic History    Marital status:      Spouse name: Not on file    Number of children: Not on file    Years of education: Not on file    Highest education level: Not on file   Occupational History    Not on file   Tobacco Use    Smoking status: Never     Passive exposure: Never    Smokeless tobacco: Never   Vaping Use    Vaping status: Never Used   Substance and Sexual Activity    Alcohol use: Yes      Alcohol/week: 3.0 standard drinks of alcohol     Types: 3 Glasses of wine per week    Drug use: Never    Sexual activity: Yes     Partners: Male     Birth control/protection: Post-menopausal   Other Topics Concern    Not on file   Social History Narrative    Not on file     Social Drivers of Health     Financial Resource Strain: Low Risk  (12/19/2023)    Overall Financial Resource Strain (CARDIA)     Difficulty of Paying Living Expenses: Not hard at all   Food Insecurity: No Food Insecurity (12/7/2023)    Hunger Vital Sign     Worried About Running Out of Food in the Last Year: Never true     Ran Out of Food in the Last Year: Never true   Transportation Needs: No Transportation Needs (12/19/2023)    PRAPARE - Transportation     Lack of Transportation (Medical): No     Lack of Transportation (Non-Medical): No   Physical Activity: Not on file   Stress: Not on file   Social Connections: Not on file   Intimate Partner Violence: Not on file   Housing Stability: Low Risk  (12/19/2023)    Housing Stability Vital Sign     Unable to Pay for Housing in the Last Year: No     Number of Places Lived in the Last Year: 1     Unstable Housing in the Last Year: No       Dinh Lewis MD  Director, [facility] [place]   Department of Orthopaedic Surgery  [facility]  [id number] [place] [address].  [place] [id number]  [phone]       [1]   Past Medical History:  Diagnosis Date    Joint pain 2000    Personal history of other diseases of the musculoskeletal system and connective tissue     History of osteoarthritis   [2]   Current Outpatient Medications:     albuterol 90 mcg/actuation inhaler, Inhale 2 puffs every 4 hours if needed., Disp: , Rfl:     apixaban (Eliquis) 5 mg (74 tabs) tablet, Take 2 tablets (10 mg) by mouth 2 times a day for 7 days, then take 1 tablet (5 mg) by mouth 2 times a day., Disp: 74 tablet, Rfl: 0    budesonide-formoteroL (Symbicort) 80-4.5  mcg/actuation inhaler, Inhale 2 puffs 2 times a day. Rinse mouth with water after use to reduce aftertaste and incidence of candidiasis. Do not swallow., Disp: 10.2 g, Rfl: 5    calcium carbonate (Oscal) 500 mg calcium (1,250 mg) tablet, Take 2 tablets by mouth once daily., Disp: , Rfl:     celecoxib (CeleBREX) 200 mg capsule, Take 1 capsule (200 mg) by mouth once daily as needed for moderate pain (4 - 6)., Disp: 30 capsule, Rfl: 0    cetirizine (ZyrTEC) 10 mg capsule, Take 1 capsule (10 mg) by mouth if needed (FOR SEASONAL ALLERGIES)., Disp: , Rfl:     cyclobenzaprine (Flexeril) 5 mg tablet, Take 1 tablet (5 mg) by mouth 2 times a day as needed for muscle spasms for up to 14 days., Disp: 10 tablet, Rfl: 0    fluticasone (Flonase) 50 mcg/actuation nasal spray, Administer 1 spray into each nostril once daily. FOR SEASONAL ALLERGIES, Disp: , Rfl:     gabapentin (Neurontin) 300 mg capsule, Take 1 capsule (300 mg) by mouth once daily at bedtime., Disp: 30 capsule, Rfl: 3    metoprolol tartrate (Lopressor) 25 mg tablet, Take 1 tablet (25 mg) by mouth 2 times a day as needed (palpitations)., Disp: 30 tablet, Rfl: 0

## 2025-07-24 ENCOUNTER — APPOINTMENT (OUTPATIENT)
Dept: ORTHOPEDIC SURGERY | Facility: CLINIC | Age: 70
End: 2025-07-24
Payer: MEDICARE

## 2025-07-28 DIAGNOSIS — M54.17 LUMBOSACRAL RADICULOPATHY: ICD-10-CM

## 2025-07-28 RX ORDER — GABAPENTIN 300 MG/1
300 CAPSULE ORAL 2 TIMES DAILY
Qty: 60 CAPSULE | Refills: 3 | Status: SHIPPED | OUTPATIENT
Start: 2025-07-28 | End: 2025-11-25

## 2025-07-28 NOTE — PROGRESS NOTES
"OUTPATIENT -  VASCULAR MEDICINE    DOS: 07/29/25    REQUESTING PHYSICIAN:  n/a    REASON FOR VISIT:  DVT     HISTORY OF PRESENT ILLNESS:     Linda Gomez is a 71-year-old female with a significant history of osteoporosis, herniated disc and chronic low back pain, who has been referred to vascular medicine for left the distal peroneal DVT (7/22/2025) anticoagulation management.  She was recently seen in the emergency room on 7/22 with left lower extremity swelling for about 5 to 6 days.  Venous duplex scan in the ED showed left common femoral, femoral and popliteal veins with normal compressibility.  There was an acute occlusive DVT within the left distal peroneal veins.  She was started on Eliquis and was discharged home.  No prior history of VTE.  Unfortunately she has been experiencing a lot of back pain lately which has affected her mobility.  That she reports that since has starting Eliquis the pain in the left lower extremity has significantly improved.  As she is following up with orthospine for her herniated disc and they have recommended her to hold off on any kind of procedures until she is off anticoagulation.  She is currently on gabapentin which helps with her neuropathic pain associated with her herniated disc.    Medical History[1]  Surgical History[2]  Social History[3]   Family History[4]   RX Allergies[5]    REVIEW OF SYSTEMS:     No fevers, chills  No sores, ulcers, rashes, skin lesions  No CP, palpitations or chest pressure  No cough, wheezing, SOB  No bleeding  No edema, no calf pain    PHYSICAL EXAMINATION:   /75 (BP Location: Left arm, Patient Position: Sitting, BP Cuff Size: Adult)   Pulse 86   Ht 1.676 m (5' 6\")   Wt 63.6 kg (140 lb 4.8 oz)   BMI 22.65 kg/m²    Gen: Appears well, NAD  Chest: CTA  CVS: regular without murmur or gallop  Abd: soft, NT/ND  Upper extremities: No swelling  Lower Ext: no edema, nontender  Skin: good condition without wounds or lesions  No open ulcers.  No " Spider/reticular/varicose veins.  No Chronic venous stasis changes   Pulses: DP 2+; PT 2+  Mood and affect appropriate      Medications   Current Outpatient Medications   Medication Instructions    albuterol 90 mcg/actuation inhaler 2 puffs, Every 4 hours PRN    apixaban (Eliquis) 5 mg (74 tabs) tablet Take 2 tablets (10 mg) by mouth 2 times a day for 7 days, then take 1 tablet (5 mg) by mouth 2 times a day.    apixaban (ELIQUIS) 5 mg, oral, 2 times daily    budesonide-formoteroL (Symbicort) 80-4.5 mcg/actuation inhaler 2 puffs, inhalation, 2 times daily RT, Rinse mouth with water after use to reduce aftertaste and incidence of candidiasis. Do not swallow.    calcium carbonate (Oscal) 500 mg calcium (1,250 mg) tablet 2 tablets, Daily    celecoxib (CELEBREX) 200 mg, oral, Daily PRN    cetirizine (ZYRTEC) 10 mg, As needed    cyclobenzaprine (FLEXERIL) 5 mg, oral, 2 times daily PRN    fluticasone (Flonase) 50 mcg/actuation nasal spray 1 spray, Daily    gabapentin (NEURONTIN) 300 mg, oral, 2 times daily    metoprolol tartrate (LOPRESSOR) 25 mg, oral, 2 times daily PRN       Lab Review   Recent Labs     07/22/25  1538 03/06/25  0836 12/19/23  0555 12/18/23  2240 12/18/23  1945 07/20/23  1354 12/13/21  1130 11/18/19  0821 05/14/19  1012    141 142  --  136 139 142 140 137   K 4.2 4.4 4.4  --  3.6 4.5 4.5 4.3 4.9    101 107  --  99 103 106 104 101   CO2 30 28 28  --  26 29 28 31 28   ANIONGAP 11 12 11  --  15 12 13 9* 13   BUN 15 16 12  --  16 19 19 15 16   CREATININE 0.74 0.72 0.82  --  1.10* 0.79 0.87 0.76 0.87   EGFR 86 89 77  --  54*  --   --   --   --    MG  --   --  2.20 1.90  --   --   --   --   --      Recent Labs     07/22/25  1538 12/18/23  1945 07/20/23  1354 12/13/21  1130 11/18/19  0821   ALBUMIN 4.4 5.2* 4.3 4.7 4.0   ALKPHOS 107 67 63  54 60 51   ALT 16 14 15 14 10   AST 22 27 25 25 18   BILITOT 0.8 0.6 0.8 0.8 0.5     Recent Labs     07/22/25  1538 03/06/25  0836 12/19/23  0554 12/18/23 1945  "07/20/23  1354 02/08/22  1552 12/13/21  1130 05/02/19 2004   WBC 7.8 8.9 7.3 8.5 8.4 6.8 9.1 8.8   HGB 14.3 15.3 12.9 15.6 13.8 13.8 14.9 13.7   HCT 44.4 48.8* 40.3 48.2* 44.2 44.6 48.9* 42.3    320 270 331 292 334 331 311   MCV 96 100.6* 99 97 98 100 103* 98     Recent Labs     05/02/19 2004   DDIMERVTE 324     PTT - No results in last year.    Recent Labs     03/06/25  0836 02/09/24  0941 12/13/21  1130 06/21/19  1143 05/15/18  0740   CHOL 239* 204* 221* 208* 241*   LDLF  --   --  126* 134* 146*   HDL 82 66.1 82.0 59.2 69.6   TRIG 95 87 65 75 127     No results found for: \"HGBA1C\"  Lab Results   Component Value Date    TSH 2.48 02/09/2024       Above labs, including the platelets, hemoglobin and renal function were reviewed.     IMAGING:    Venous VENOUS DUS 7/22/25  FINDINGS:  There is acute occlusive DVT demonstrated within the left distal  peroneal veins.  The left common femoral, femoral, and popliteal veins demonstrated  normal compressibility, normal phasic venous flow, and normal response  to augmentation.  There is no evidence for echogenic thrombi.  The  posterior tibial veins are patent.  IMPRESSION:  Evidence for acute occlusive DVT within the left distal peroneal  veins.      ASSESSMENT/PLAN:    Linda Gomez is a 71-year-old female with a significant history of osteoporosis who has been referred to vascular medicine for the left distal peroneal DVT (7/22/2025) anticoagulation management.    _ left distal peroneal DVT (7/22/2025)   _ anticoagulation with Eliquis  _Chronic low back pain, following with orthospine and planning on possible procedures in the future once off anticoagulation.    Plan;  - continue with Eliquis 5 mg BID   - Monitor for any abnormal signs of bleed while on anticoagulation  -Will check for limited APLS (anticardiolipin antibody and beta-2 glycoprotein.  Lupus anticoagulant cannot be reliably checked while on DOAC.  - repeat US ordered to be done before the next " visit.  -Follow-up in 2-3 weeks or sooner if needed.    Diagnoses and all orders for this visit:  Acute deep vein thrombosis (DVT) of left peroneal vein (Multi) (Primary)  -     Beta-2 Glycoprotein Antibodies; Future  -     Anti-Cardiolipin Antibody (IgA, IgG, and IgM); Future  -     apixaban (Eliquis) 5 mg tablet; Take 1 tablet (5 mg) by mouth 2 times a day.  -     Vascular US Lower Extremity Venous Duplex Left; Future  -     Beta-2 Glycoprotein Antibodies  -     Anti-Cardiolipin Antibody (IgA, IgG, and IgM)  Anticoagulation management encounter      Anette Seymour MD         [1]   Past Medical History:  Diagnosis Date    Asthma     Deep vein thrombosis (Multi)     Joint pain 2000    Personal history of other diseases of the musculoskeletal system and connective tissue     History of osteoarthritis   [2]   Past Surgical History:  Procedure Laterality Date    ANKLE SURGERY  01/13/2015    Ankle Surgery    FRACTURE SURGERY  2009-patella    JOINT REPLACEMENT  2004-hip    KNEE ARTHROSCOPY W/ DEBRIDEMENT  01/30/2014    Arthroscopy Knee Right    ORTHOPEDIC SURGERY  2014-ankle/foot, 2007-rotator cuff    OTHER SURGICAL HISTORY  01/30/2014    Reported Hx Of Hip Replacement - Right Side    OTHER SURGICAL HISTORY  01/30/2014    Treatment Of Knee Fracture Patellar, Open    ROTATOR CUFF REPAIR  01/30/2014    Rotator Cuff Repair   [3]   Social History  Socioeconomic History    Marital status:    Tobacco Use    Smoking status: Never     Passive exposure: Never    Smokeless tobacco: Never   Vaping Use    Vaping status: Never Used   Substance and Sexual Activity    Alcohol use: Not Currently     Alcohol/week: 3.0 standard drinks of alcohol     Types: 3 Glasses of wine per week    Drug use: Never    Sexual activity: Not Currently     Partners: Male     Birth control/protection: Post-menopausal     Social Drivers of Health     Financial Resource Strain: Low Risk  (12/19/2023)    Overall Financial Resource Strain (CARDIA)      Difficulty of Paying Living Expenses: Not hard at all   Food Insecurity: No Food Insecurity (12/7/2023)    Hunger Vital Sign     Worried About Running Out of Food in the Last Year: Never true     Ran Out of Food in the Last Year: Never true   Transportation Needs: No Transportation Needs (12/19/2023)    PRAPARE - Transportation     Lack of Transportation (Medical): No     Lack of Transportation (Non-Medical): No   Housing Stability: Low Risk  (12/19/2023)    Housing Stability Vital Sign     Unable to Pay for Housing in the Last Year: No     Number of Places Lived in the Last Year: 1     Unstable Housing in the Last Year: No   [4]   Family History  Problem Relation Name Age of Onset    Other (esophageal ulcer) Mother      Osteoporosis Mother      Transient ischemic attack Mother      Diabetes Father      Drug abuse Father      Hypertension Father      Breast cancer Sister      Cataracts Sister      Diabetes Sister      Hypothyroidism Sister      Lung cancer Sister      Heart attack Father  70 - 79    Heart disease Brother  20 - 29   [5]   Allergies  Allergen Reactions    Amoxicillin-Pot Clavulanate Unknown     GI issues    Cat Dander Other     itchy nose    Dog Hair Standardized Allergenic Extract Other     tchy nose    Pollen Extracts Other     sneezing, runny nose    Ragweed Other     sneezing, runny nose    Sulfasalazine Other     GI issues    Tree And Shrub Pollen Wheezing

## 2025-07-29 ENCOUNTER — OFFICE VISIT (OUTPATIENT)
Dept: CARDIOLOGY | Facility: CLINIC | Age: 70
End: 2025-07-29
Payer: MEDICARE

## 2025-07-29 VITALS
HEART RATE: 86 BPM | WEIGHT: 140.3 LBS | DIASTOLIC BLOOD PRESSURE: 75 MMHG | BODY MASS INDEX: 22.55 KG/M2 | SYSTOLIC BLOOD PRESSURE: 128 MMHG | OXYGEN SATURATION: 97 % | HEIGHT: 66 IN

## 2025-07-29 DIAGNOSIS — Z51.81 ANTICOAGULATION MANAGEMENT ENCOUNTER: ICD-10-CM

## 2025-07-29 DIAGNOSIS — I82.452 ACUTE DEEP VEIN THROMBOSIS (DVT) OF LEFT PERONEAL VEIN (MULTI): Primary | ICD-10-CM

## 2025-07-29 DIAGNOSIS — Z79.01 ANTICOAGULATION MANAGEMENT ENCOUNTER: ICD-10-CM

## 2025-07-29 PROCEDURE — 99212 OFFICE O/P EST SF 10 MIN: CPT | Performed by: STUDENT IN AN ORGANIZED HEALTH CARE EDUCATION/TRAINING PROGRAM

## 2025-07-29 ASSESSMENT — ENCOUNTER SYMPTOMS
DEPRESSION: 0
OCCASIONAL FEELINGS OF UNSTEADINESS: 1
LOSS OF SENSATION IN FEET: 1

## 2025-07-29 ASSESSMENT — COLUMBIA-SUICIDE SEVERITY RATING SCALE - C-SSRS
2. HAVE YOU ACTUALLY HAD ANY THOUGHTS OF KILLING YOURSELF?: NO
6. HAVE YOU EVER DONE ANYTHING, STARTED TO DO ANYTHING, OR PREPARED TO DO ANYTHING TO END YOUR LIFE?: NO
1. IN THE PAST MONTH, HAVE YOU WISHED YOU WERE DEAD OR WISHED YOU COULD GO TO SLEEP AND NOT WAKE UP?: NO

## 2025-07-29 ASSESSMENT — PAIN SCALES - GENERAL: PAINLEVEL_OUTOF10: 2

## 2025-07-29 NOTE — PATIENT INSTRUCTIONS
- continue with Eliquis 5 mg twice daily   - Monitor for any abnormal signs of bleed while on anticoagulation  -Will check for limited APLS (anticardiolipin antibody and beta-2 glycoprotein.  Lupus anticoagulant cannot be reliably checked while on DOAC.  - repeat US ordered to be done prior to your next visit.   -Follow-up in 2-3 weeks or sooner if needed.

## 2025-07-30 ENCOUNTER — APPOINTMENT (OUTPATIENT)
Dept: CARDIOLOGY | Facility: CLINIC | Age: 70
End: 2025-07-30
Payer: MEDICARE

## 2025-08-01 LAB
B2 GLYCOPROT1 IGA SER-ACNC: <2 U/ML
B2 GLYCOPROT1 IGG SER-ACNC: <2 U/ML
B2 GLYCOPROT1 IGM SER-ACNC: <2 U/ML
CARDIOLIPIN IGA SER IA-ACNC: <2 APL-U/ML
CARDIOLIPIN IGG SER IA-ACNC: <2 GPL-U/ML
CARDIOLIPIN IGM SER IA-ACNC: 2.5 MPL-U/ML

## 2025-08-05 ENCOUNTER — APPOINTMENT (OUTPATIENT)
Facility: HOSPITAL | Age: 70
End: 2025-08-05
Payer: MEDICARE

## 2025-08-19 ENCOUNTER — HOSPITAL ENCOUNTER (OUTPATIENT)
Dept: VASCULAR MEDICINE | Facility: CLINIC | Age: 70
Discharge: HOME | End: 2025-08-19
Payer: MEDICARE

## 2025-08-19 ENCOUNTER — OFFICE VISIT (OUTPATIENT)
Dept: CARDIOLOGY | Facility: CLINIC | Age: 70
End: 2025-08-19
Payer: MEDICARE

## 2025-08-19 ENCOUNTER — APPOINTMENT (OUTPATIENT)
Dept: CARDIOLOGY | Facility: CLINIC | Age: 70
End: 2025-08-19
Payer: MEDICARE

## 2025-08-19 ENCOUNTER — APPOINTMENT (OUTPATIENT)
Dept: VASCULAR MEDICINE | Facility: CLINIC | Age: 70
End: 2025-08-19
Payer: MEDICARE

## 2025-08-19 VITALS
HEIGHT: 67 IN | OXYGEN SATURATION: 96 % | HEART RATE: 84 BPM | DIASTOLIC BLOOD PRESSURE: 69 MMHG | BODY MASS INDEX: 22.29 KG/M2 | SYSTOLIC BLOOD PRESSURE: 114 MMHG | WEIGHT: 142 LBS

## 2025-08-19 DIAGNOSIS — I82.452 ACUTE DEEP VEIN THROMBOSIS (DVT) OF LEFT PERONEAL VEIN (MULTI): Primary | ICD-10-CM

## 2025-08-19 DIAGNOSIS — I82.452 ACUTE DEEP VEIN THROMBOSIS (DVT) OF LEFT PERONEAL VEIN (MULTI): ICD-10-CM

## 2025-08-19 DIAGNOSIS — I82.4Z2 ACUTE EMBOLISM AND THROMBOSIS OF UNSPECIFIED DEEP VEINS OF LEFT DISTAL LOWER EXTREMITY: ICD-10-CM

## 2025-08-19 PROCEDURE — 93971 EXTREMITY STUDY: CPT

## 2025-08-19 PROCEDURE — 99212 OFFICE O/P EST SF 10 MIN: CPT | Mod: 25

## 2025-08-19 PROCEDURE — 93971 EXTREMITY STUDY: CPT | Performed by: SURGERY

## 2025-08-19 ASSESSMENT — ENCOUNTER SYMPTOMS
LOSS OF SENSATION IN FEET: 0
OCCASIONAL FEELINGS OF UNSTEADINESS: 0
DEPRESSION: 0

## 2025-08-19 ASSESSMENT — PAIN SCALES - GENERAL: PAINLEVEL_OUTOF10: 0-NO PAIN

## 2025-08-25 DIAGNOSIS — J45.991 COUGH VARIANT ASTHMA (HHS-HCC): ICD-10-CM

## 2025-08-28 ENCOUNTER — TELEPHONE (OUTPATIENT)
Dept: PULMONOLOGY | Facility: HOSPITAL | Age: 70
End: 2025-08-28
Payer: MEDICARE

## 2025-08-28 RX ORDER — BUDESONIDE AND FORMOTEROL FUMARATE 80; 4.5 UG/1; UG/1
2 AEROSOL, METERED RESPIRATORY (INHALATION) 2 TIMES DAILY
Qty: 10.2 G | Refills: 5 | Status: SHIPPED | OUTPATIENT
Start: 2025-08-28

## 2025-09-18 ENCOUNTER — APPOINTMENT (OUTPATIENT)
Dept: PULMONOLOGY | Facility: CLINIC | Age: 70
End: 2025-09-18
Payer: MEDICARE

## 2025-09-25 ENCOUNTER — APPOINTMENT (OUTPATIENT)
Dept: PULMONOLOGY | Facility: CLINIC | Age: 70
End: 2025-09-25
Payer: MEDICARE

## 2025-10-20 ENCOUNTER — APPOINTMENT (OUTPATIENT)
Dept: PRIMARY CARE | Facility: CLINIC | Age: 70
End: 2025-10-20
Payer: MEDICARE

## 2025-12-16 ENCOUNTER — APPOINTMENT (OUTPATIENT)
Dept: RHEUMATOLOGY | Facility: CLINIC | Age: 70
End: 2025-12-16
Payer: MEDICARE